# Patient Record
Sex: FEMALE | Race: WHITE | NOT HISPANIC OR LATINO | Employment: UNEMPLOYED | URBAN - METROPOLITAN AREA
[De-identification: names, ages, dates, MRNs, and addresses within clinical notes are randomized per-mention and may not be internally consistent; named-entity substitution may affect disease eponyms.]

---

## 2018-05-30 ENCOUNTER — OFFICE VISIT (OUTPATIENT)
Dept: PEDIATRICS | Facility: CLINIC | Age: 53
End: 2018-05-30
Payer: COMMERCIAL

## 2018-05-30 VITALS
WEIGHT: 115 LBS | DIASTOLIC BLOOD PRESSURE: 62 MMHG | OXYGEN SATURATION: 99 % | BODY MASS INDEX: 20.38 KG/M2 | HEIGHT: 63 IN | HEART RATE: 55 BPM | SYSTOLIC BLOOD PRESSURE: 92 MMHG | TEMPERATURE: 97.7 F

## 2018-05-30 DIAGNOSIS — Z01.818 PREOP GENERAL PHYSICAL EXAM: Primary | ICD-10-CM

## 2018-05-30 DIAGNOSIS — T85.49XA DEFLATION OF BREAST IMPLANT, INITIAL ENCOUNTER: ICD-10-CM

## 2018-05-30 PROCEDURE — 99214 OFFICE O/P EST MOD 30 MIN: CPT | Performed by: PEDIATRICS

## 2018-05-30 RX ORDER — SERTRALINE HYDROCHLORIDE 25 MG/1
25 TABLET, FILM COATED ORAL
COMMUNITY
Start: 2017-10-04 | End: 2021-01-04

## 2018-05-30 RX ORDER — ESTRADIOL 0.04 MG/D
1 PATCH, EXTENDED RELEASE TRANSDERMAL
COMMUNITY
Start: 2017-10-05 | End: 2021-01-04

## 2018-05-30 NOTE — MR AVS SNAPSHOT
After Visit Summary   5/30/2018    Bhakti Ibarra    MRN: [id number]           Patient Information     Date Of Birth          1965        Visit Information        Provider Department      5/30/2018 8:40 AM Beronica Mcdowell MD [facility] [place]        Today's Diagnoses     Preop general physical exam    -  1      Care Instructions    Pre-op Instructions:  -One week prior to surgery: NO aspirin or ibuprofen products (Advil, excerdrin, etc)  -OK to use tylenol for aches and pains  -stop all supplements one week prior to surgery as well.    Please call us with fax #    Before Your Surgery      Call your surgeon if there is any change in your health. This includes signs of a cold or flu (such as a sore throat, runny nose, cough, rash or fever).    Do not smoke, drink alcohol or take over the counter medicine (unless your surgeon or primary care doctor tells you to) for the 24 hours before and after surgery.    If you take prescribed drugs: Follow your doctor s orders about which medicines to take and which to stop until after surgery.    Eating and drinking prior to surgery: follow the instructions from your surgeon    Take a shower or bath the night before surgery. Use the soap your surgeon gave you to gently clean your skin. If you do not have soap from your surgeon, use your regular soap. Do not shave or scrub the surgery site.  Wear clean pajamas and have clean sheets on your bed.           Follow-ups after your visit        Who to contact     If you have questions or need follow up information about today's clinic visit or your schedule please contact [facility] [place] directly at [phone].  Normal or non-critical lab and imaging results will be communicated to you by MyChart, letter or phone within 4 business days after the clinic has received the results. If you do not hear from us within 7 days, please contact the clinic through MyChart or phone. If you have a critical or  "abnormal lab result, we will notify you by phone as soon as possible.  Submit refill requests through [employer] or call your pharmacy and they will forward the refill request to us. Please allow 3 business days for your refill to be completed.          Additional Information About Your Visit        Care EveryWhere ID     This is your Care EveryWhere ID. This could be used by other organizations to access your [place] medical records  [id number]        Your Vitals Were     Pulse Temperature Height Pulse Oximetry BMI (Body Mass Index)       55 97.7  F (36.5  C) (Oral) 5' 3\" (1.6 m) 99% 20.37 kg/m2        Blood Pressure from Last 3 Encounters:   05/30/18 92/62    Weight from Last 3 Encounters:   05/30/18 115 lb (52.2 kg)              Today, you had the following     No orders found for display       Primary Care Provider Fax #    Physician No Ref-Primary [phone]       No address on file        Equal Access to Services     [facility] : Hadii aad ku hadasho Sojesse, waaxda luqadaha, qaybta kaalmada adeegyada, waxay pattiin hayarthur jeff . So [facility] [phone].    ATENCIÓN: Si habla español, tiene a moreno disposición servicios gratuitos de asistencia lingüística. Jordanjackie al [phone].    We comply with applicable federal civil rights laws and [place] laws. We do not discriminate on the basis of race, color, national origin, age, disability, sex, sexual orientation, or gender identity.            Thank you!     Thank you for choosing [facility] [place]  for your care. Our goal is always to provide you with excellent care. Hearing back from our patients is one way we can continue to improve our services. Please take a few minutes to complete the written survey that you may receive in the mail after your visit with us. Thank you!             Your Updated Medication List - Protect others around you: Learn how to safely use, store and throw away your medicines at www.disposemymeds.org.          This " list is accurate as of 5/30/18  9:20 AM.  Always use your most recent med list.                   Brand Name Dispense Instructions for use Diagnosis    estradiol 0.0375 MG/24HR BIW patch    VIVELLE-DOT     1 patch        progesterone 100 MG capsule    PROMETRIUM     Take 100 mg by mouth        sertraline 25 MG tablet    ZOLOFT     Take 25 mg by mouth

## 2018-05-30 NOTE — PATIENT INSTRUCTIONS
Pre-op Instructions:  -One week prior to surgery: NO aspirin or ibuprofen products (Advil, excerdrin, etc)  -OK to use tylenol for aches and pains  -stop all supplements one week prior to surgery as well.    Please call us with fax #    Before Your Surgery      Call your surgeon if there is any change in your health. This includes signs of a cold or flu (such as a sore throat, runny nose, cough, rash or fever).    Do not smoke, drink alcohol or take over the counter medicine (unless your surgeon or primary care doctor tells you to) for the 24 hours before and after surgery.    If you take prescribed drugs: Follow your doctor s orders about which medicines to take and which to stop until after surgery.    Eating and drinking prior to surgery: follow the instructions from your surgeon    Take a shower or bath the night before surgery. Use the soap your surgeon gave you to gently clean your skin. If you do not have soap from your surgeon, use your regular soap. Do not shave or scrub the surgery site.  Wear clean pajamas and have clean sheets on your bed.

## 2018-05-30 NOTE — PROGRESS NOTES
[facility] [place]  [address]  [address]  [place] [address] [phone]  Dept: [phone]    PRE-OP EVALUATION:  Today's date: 2018    Bhakti Ibarra (: 1965) presents for pre-operative evaluation assessment as requested by Dr. Lopez.  She requires evaluation and anesthesia risk assessment prior to undergoing surgery/procedure for treatment of implant removal for implant deflation.    Fax number for surgical facility: [phone] and [phone]  Primary Physician: No Ref-Primary, Physician  Type of Anesthesia Anticipated: to be determined    [facility] - SLP    Patient has a Health Care Directive or Living Will:  NO    Preop Questions 2018   Who is doing your surgery? dr lopez   What are you having done? implant removal   Date of Surgery/Procedure: 2018   1.  Do you have a history of Heart attack, stroke, stent, coronary bypass surgery, or other heart surgery? No   2.  Do you ever have any pain or discomfort in your chest? No   3.  Do you have a history of  Heart Failure? No   4.   Are you troubled by shortness of breath when:  walking on a level surface, or up a slight hill, or at night? No   5.  Do you currently have a cold, bronchitis or other respiratory infection? No   6.  Do you have a cough, shortness of breath, or wheezing? No   7.  Do you sometimes get pains in the calves of your legs when you walk? No   8. Do you or anyone in your family have previous history of blood clots? No   9.  Do you or does anyone in your family have a serious bleeding problem such as prolonged bleeding following surgeries or cuts? No   10. Have you ever had problems with anemia or been told to take iron pills? YES - no bleeding since ablation 5 years ago   11. Have you had any abnormal blood loss such as black, tarry or bloody stools, or abnormal vaginal bleeding? No   12. Have you ever had a blood transfusion? No   13. Have you or any of your relatives ever had  "problems with anesthesia? No   14. Do you have sleep apnea, excessive snoring or daytime drowsiness? No   15. Do you have any prosthetic heart valves? No   16. Do you have prosthetic joints? No   17. Is there any chance that you may be pregnant? No         HPI:     HPI related to upcoming procedure: Patient with bilateral implants, now with right side deflated.  She is electing to get them both removed.      See problem list for active medical problems.  Problems all longstanding and stable, except as noted/documented.  See ROS for pertinent symptoms related to these conditions.                                                                                                                                                          .    MEDICAL HISTORY:   There are no active problems to display for this patient.     History reviewed. No pertinent past medical history.  History reviewed. No pertinent surgical history.  Current Outpatient Prescriptions   Medication Sig Dispense Refill     estradiol (VIVELLE-DOT) 0.0375 MG/24HR BIW patch 1 patch       progesterone (PROMETRIUM) 100 MG capsule Take 100 mg by mouth       sertraline (ZOLOFT) 25 MG tablet Take 25 mg by mouth       OTC products: MVI    No Known Allergies   Latex Allergy: NO    Social History   Substance Use Topics     Smoking status: Never Smoker     Smokeless tobacco: Never Used     Alcohol use Yes      Comment: Social     History   Drug Use No       REVIEW OF SYSTEMS:   Constitutional, HEENT, cardiovascular, pulmonary, gi and gu systems are negative, except as otherwise noted.    EXAM:   BP 92/62 (BP Location: Right arm, Cuff Size: Adult Regular)  Pulse 55  Temp 97.7  F (36.5  C) (Oral)  Ht 5' 3\" (1.6 m)  Wt 115 lb (52.2 kg)  SpO2 99%  BMI 20.37 kg/m2    GENERAL APPEARANCE: healthy, alert and no distress     EYES: EOMI, PERRL     HENT: ear canals and TM's normal and nose and mouth without ulcers or lesions     NECK: no adenopathy, no asymmetry, masses, " or scars and thyroid normal to palpation     RESP: lungs clear to auscultation - no rales, rhonchi or wheezes     CV: regular rates and rhythm, normal S1 S2, no S3 or S4 and no murmur, click or rub     ABDOMEN:  soft, nontender, no HSM or masses and bowel sounds normal     MS: extremities normal- no gross deformities noted, no evidence of inflammation in joints, FROM in all extremities.     PSYCH: mentation appears normal. and affect normal/bright     LYMPHATICS: No cervical adenopathy    DIAGNOSTICS:   No labs or EKG required for low risk surgery (cataract, skin procedure, breast biopsy, etc)    No results for input(s): HGB, PLT, INR, NA, POTASSIUM, CR, A1C in the last 74823 hours.     IMPRESSION:   Reason for surgery/procedure: breast implant deflation    The proposed surgical procedure is considered LOW risk.    REVISED CARDIAC RISK INDEX  The patient has the following serious cardiovascular risks for perioperative complications such as (MI, PE, VFib and 3  AV Block):  No serious cardiac risks  INTERPRETATION: 0 risks: Class I (very low risk - 0.4% complication rate)    The patient has the following additional risks for perioperative complications:  No identified additional risks      ICD-10-CM    1. Preop general physical exam Z01.818    2. Deflation of breast implant, initial encounter T85.49XA        RECOMMENDATIONS:             APPROVAL GIVEN to proceed with proposed procedure, without further diagnostic evaluation       Signed Electronically by: Beronica Mcdowell MD    Copy of this evaluation report is provided to requesting physician.    [place] Preop Guidelines    Revised Cardiac Risk Index

## 2021-01-04 RX ORDER — ESTRADIOL 0.03 MG/D
1 FILM, EXTENDED RELEASE TRANSDERMAL
COMMUNITY
Start: 2020-02-13 | End: 2022-08-25

## 2021-01-04 RX ORDER — NITROFURANTOIN 25; 75 MG/1; MG/1
100 CAPSULE ORAL
COMMUNITY
Start: 2020-02-11 | End: 2023-08-10

## 2021-01-04 RX ORDER — SERTRALINE HYDROCHLORIDE 25 MG/1
25 TABLET, FILM COATED ORAL
COMMUNITY
Start: 2020-02-11

## 2021-01-05 ENCOUNTER — OFFICE VISIT (OUTPATIENT)
Dept: FAMILY MEDICINE | Facility: CLINIC | Age: 56
End: 2021-01-05
Payer: COMMERCIAL

## 2021-01-05 VITALS
SYSTOLIC BLOOD PRESSURE: 98 MMHG | DIASTOLIC BLOOD PRESSURE: 54 MMHG | WEIGHT: 112 LBS | BODY MASS INDEX: 19.84 KG/M2 | OXYGEN SATURATION: 99 % | TEMPERATURE: 97.9 F | RESPIRATION RATE: 18 BRPM | HEART RATE: 56 BPM

## 2021-01-05 DIAGNOSIS — H04.123 DRY EYES: ICD-10-CM

## 2021-01-05 DIAGNOSIS — Z01.818 PREOP GENERAL PHYSICAL EXAM: Primary | ICD-10-CM

## 2021-01-05 LAB — HGB BLD-MCNC: 12 G/DL (ref 11.7–15.7)

## 2021-01-05 PROCEDURE — 84439 ASSAY OF FREE THYROXINE: CPT | Performed by: STUDENT IN AN ORGANIZED HEALTH CARE EDUCATION/TRAINING PROGRAM

## 2021-01-05 PROCEDURE — 85018 HEMOGLOBIN: CPT | Performed by: STUDENT IN AN ORGANIZED HEALTH CARE EDUCATION/TRAINING PROGRAM

## 2021-01-05 PROCEDURE — 84443 ASSAY THYROID STIM HORMONE: CPT | Performed by: STUDENT IN AN ORGANIZED HEALTH CARE EDUCATION/TRAINING PROGRAM

## 2021-01-05 PROCEDURE — 99214 OFFICE O/P EST MOD 30 MIN: CPT | Performed by: STUDENT IN AN ORGANIZED HEALTH CARE EDUCATION/TRAINING PROGRAM

## 2021-01-05 PROCEDURE — 36415 COLL VENOUS BLD VENIPUNCTURE: CPT | Performed by: STUDENT IN AN ORGANIZED HEALTH CARE EDUCATION/TRAINING PROGRAM

## 2021-01-05 NOTE — PATIENT INSTRUCTIONS
"Preparing for Your Surgery  Getting started  A surgery nurse will call you to review your health history and instructions. They will give you an arrival time based on your scheduled surgery time.  Please be ready to share the following:    Your doctor's clinic name and phone number    Your medical, surgical and anesthesia history    A list of allergies and sensitivities    A list of medicines, including herbal treatments and over-the-counter drugs    Whether the patient has a legal guardian (ask how to send us the papers in advance)  If your child is having surgery, please ask for a copy of Preparing for Your Child's Surgery.    Preparing for surgery    Within 30 days of surgery: Have an exam at your family clinic (primary care clinic), or go to a pre-operative clinic. This exam is called a \"History and Physical,\" or H&P.    At your H&P exam, talk to your care team about all medicines you take. If you need to stop any medicines before surgery, ask when to start taking them again.  ? We do this for your safety. Many medicines can make you bleed too much during surgery. Some change how well surgery (anesthesia) drugs work.    Call your insurance company to see what it will and won't pay for. Ask if they need to pre-approve the surgery. (If no insurance, call [phone].)    Call your surgeon's clinic if there's any change in your health. This includes signs of a cold or flu (sore throat, runny nose, cough, rash, fever). It also includes a scrape or scratch near the surgery site.    If you have questions on the day of surgery, call your surgery center.  Eating and drinking guidelines  For your safety: Unless your surgeon tells you otherwise, follow the guidelines below.    Eat and drink as usual until 8 hours before surgery. After that, no food or milk.    Drink clear liquids until 2 hours before surgery. These are liquids you can see through, like water, Gatorade and Propel Water. You may also have black coffee and " tea (no cream or milk).    Nothing by mouth within 2 hours of surgery. This includes gum, candy and breath mints.    Stop alcohol the midnight before surgery.    If your family clinic tells you to take medicine on the morning of surgery, it's okay to take it with a sip of water.  Preventing infection    Shower or bathe the night before and morning of your surgery. Follow the instructions your clinic gave you. (If no instructions, use regular soap.)    Don't shave or clip hair near your surgery site. This can lead to skin infection.    Don't smoke the morning of surgery. Smoking increases the risk of infection. You may chew nicotine gum up to 2 hours before surgery. A nicotine patch is okay.  ? Note: Some surgeries require you to completely quit smoking and nicotine. Check with your surgeon.    Your care team will make every effort to keep you safe from infection. We will:  ? Clean our hands often with soap and water (or an alcohol-based hand rub).  ? Clean the skin at your surgery site with a special soap that kills germs. We'll also remove hair from the site as needed.  ? Wear special hair covers, masks, gowns and gloves during surgery.  ? Give antibiotic medicine, if prescribed. Not all surgeries need antibiotics.  What to bring on the day of surgery    Photo ID and insurance card    Copy of your health care directive, if you have one    Glasses and hearing aides (bring cases)  ? You can't wear contacts during surgery    Inhaler and eye drops, if you use them (tell us about these when you arrive)    CPAP machine or breathing device, if you use them    A few personal items, if spending the night    If you have . . .  ? A pacemaker or ICD (cardiac defibrillator): Bring the ID card.  ? An implanted stimulator: Bring the remote control.  ? A legal guardian: Bring a copy of the certified (court-stamped) guardianship papers.  Please remove any jewelry, including body piercings. Leave jewelry and other valuables at  "home.  If you're going home the day of surgery  Important: If you don't follow the rules below, we must cancel your surgery.     Arrange for someone to drive you home after surgery. You may not drive, take a taxi or take public transportation by yourself (unless you'll have local anesthesia only).    Arrange for a responsible adult to stay with you overnight. If you don't, we may keep you in the hospital overnight, and you may need to pay the costs yourself.  Questions?   If you have any questions for your care team, list them here: _________________________________________________________________________________________________________________________________________________________________________________________________________________________________________________________________________________________________________________________  For informational purposes only. Not to replace the advice of your health care provider. Copyright   8395-9821 [employer]. All rights reserved. Clinically reviewed by Verenice Serrano MD. [employer] [id number] - REV 07/19.    Preparing for Your Surgery  Getting started  A surgery nurse will call you to review your health history and instructions. They will give you an arrival time based on your scheduled surgery time.  Please be ready to share the following:    Your doctor's clinic name and phone number    Your medical, surgical and anesthesia history    A list of allergies and sensitivities    A list of medicines, including herbal treatments and over-the-counter drugs    Whether the patient has a legal guardian (ask how to send us the papers in advance)  If your child is having surgery, please ask for a copy of Preparing for Your Child's Surgery.    Preparing for surgery    Within 30 days of surgery: Have an exam at your family clinic (primary care clinic), or go to a pre-operative clinic. This exam is called a \"History and Physical,\" or H&P.    At your H&P exam, talk to " your care team about all medicines you take. If you need to stop any medicines before surgery, ask when to start taking them again.  ? We do this for your safety. Many medicines can make you bleed too much during surgery. Some change how well surgery (anesthesia) drugs work.    Call your insurance company to see what it will and won't pay for. Ask if they need to pre-approve the surgery. (If no insurance, call [phone].)    Call your surgeon's clinic if there's any change in your health. This includes signs of a cold or flu (sore throat, runny nose, cough, rash, fever). It also includes a scrape or scratch near the surgery site.    If you have questions on the day of surgery, call your surgery center.  Eating and drinking guidelines  For your safety: Unless your surgeon tells you otherwise, follow the guidelines below.    Eat and drink as usual until 8 hours before surgery. After that, no food or milk.    Drink clear liquids until 2 hours before surgery. These are liquids you can see through, like water, Gatorade and Propel Water. You may also have black coffee and tea (no cream or milk).    Nothing by mouth within 2 hours of surgery. This includes gum, candy and breath mints.    Stop alcohol the midnight before surgery.    If your family clinic tells you to take medicine on the morning of surgery, it's okay to take it with a sip of water.  Preventing infection    Shower or bathe the night before and morning of your surgery. Follow the instructions your clinic gave you. (If no instructions, use regular soap.)    Don't shave or clip hair near your surgery site. This can lead to skin infection.    Don't smoke the morning of surgery. Smoking increases the risk of infection. You may chew nicotine gum up to 2 hours before surgery. A nicotine patch is okay.  ? Note: Some surgeries require you to completely quit smoking and nicotine. Check with your surgeon.    Your care team will make every effort to keep you safe  from infection. We will:  ? Clean our hands often with soap and water (or an alcohol-based hand rub).  ? Clean the skin at your surgery site with a special soap that kills germs. We'll also remove hair from the site as needed.  ? Wear special hair covers, masks, gowns and gloves during surgery.  ? Give antibiotic medicine, if prescribed. Not all surgeries need antibiotics.  What to bring on the day of surgery    Photo ID and insurance card    Copy of your health care directive, if you have one    Glasses and hearing aides (bring cases)  ? You can't wear contacts during surgery    Inhaler and eye drops, if you use them (tell us about these when you arrive)    CPAP machine or breathing device, if you use them    A few personal items, if spending the night    If you have . . .  ? A pacemaker or ICD (cardiac defibrillator): Bring the ID card.  ? An implanted stimulator: Bring the remote control.  ? A legal guardian: Bring a copy of the certified (court-stamped) guardianship papers.  Please remove any jewelry, including body piercings. Leave jewelry and other valuables at home.  If you're going home the day of surgery  Important: If you don't follow the rules below, we must cancel your surgery.     Arrange for someone to drive you home after surgery. You may not drive, take a taxi or take public transportation by yourself (unless you'll have local anesthesia only).    Arrange for a responsible adult to stay with you overnight. If you don't, we may keep you in the hospital overnight, and you may need to pay the costs yourself.  Questions?   If you have any questions for your care team, list them here: _________________________________________________________________________________________________________________________________________________________________________________________________________________________________________________________________________________________________________________________  For  "informational purposes only. Not to replace the advice of your health care provider. Copyright   1133-3328 [facility]. All rights reserved. Clinically reviewed by Verenice Serrano MD. [employer] [id number] - REV 07/19.    Before Your Procedure or Hospital Admission  Testing for COVID-19 (Coronavirus)  Thank you for choosing [facility] for your health care needs. The COVID-19 pandemic is a very challenging time for everyone.   Our goal is to keep you and our team here at [facility] safe and healthy. We've taken many steps to make this happen. For example:    We test and screen our staff, care teams and patients for COVID-19.    Everyone at [facility] must wear a mask and stay 6 feet apart.    We are limiting hospital and clinic visitors.  Before you come in  All patients must get tested for COVID-19. Your test needs to happen 3 to 4 days before you check in to the hospital or surgery site.   A clinic scheduler will call about a week in advance to set up a testing time at one of our labs. We'll take a swab of your nose or throat.  Note: If you go to a clinic or pharmacy like [facility] or [facility] for your test, make sure you get a test deep inside the nose. This is called a   naso-pharyngeal (NP) RT-PCR lab test. Don't get a \"rapid\" test or a saliva (spit) test. See \"Questions and Answers\" on the next page.  After the test, please stay at home and stay out of contact with other people. It will be harder for you to recover if you get COVID-19 before your treatment.  Please follow all current safety guidelines, including:    Limit trips outside your home.    Limit the number of people you see.    Always wear a mask outside your home.    Use social distancing. Stay 6 feet away from others whenever you can.    Wash your hands often.  If your test shows you have COVID-19  If your test is positive, we'll let you know. A positive test means that you have the virus.   We'll probably have to postpone your " admission, surgery or procedure. Your doctor will discuss this with you. After that, we'll let you know what to do and when you can re-schedule.   We may need to cancel your treatment on short notice for other reasons, too.  If your test shows you DON'T have COVID-19  Even if your test is negative, you can still get COVID-19. It's rare but, sometimes, the test result is wrong. You could also catch the virus after taking the test.   There's a very small chance that you could catch COVID-19 in the hospital or surgery center. [facility] has taken many steps to prevent this from happening.   Day of your surgery or procedure    Please come wearing a face covering that covers both your nose and mouth.    When you arrive, we'll ask you some questions to find out if you have any signs or symptoms of COVID-19.    Ask your care team if you can have visitors. All visitors must wear face coverings and will be screened for signs of COVID-19.  ? Even if no visitors are allowed, you can still have with you:    Your legal guardian or legal decision maker    A parent and one other visitor, if you are younger than 18 years old    A partner and a , if you are in labor  ? We might need to teach you about taking care of yourself after surgery. If so, a visitor can come into the hospital to learn about it, too.  ? The rules for visitors change often, depending on how much the virus is spreading. To learn more, see Visiting a Loved One in the Hospital during the COVID-19 Outbreak.  Please call your care team, hospital or surgery center if you have any questions. We thank you for your understanding and for choosing [facility] for your care.   Questions and Answers  Does it matter where I get tested for COVID-19?  Yes. We urge you to get tested at one of our [facility] COVID-19 testing sites. We process these tests in our lab and can get the results quickly. Your [facility] care team needs to get your  "results before you check in.  What should I do if I can't get tested at [facility]?  You can get tested somewhere else, but you'll need to take these extra steps:   1. Contact your family doctor or clinic to arrange your test.  2. Take the test within 4 days of your surgery or procedure. We can't accept tests older than 4 days.  3. Make sure you're getting a test deep inside the nose (a naso-pharyngeal, or NP, RT-PCR lab test). Many pharmacies use \"rapid\" tests or saliva (spit) tests. We don't accept those tests before surgery or procedures. For adults, tests deep inside the nose are the most accurate tests.  4. Make sure your doctor or clinic faxes your results to [facility] at [phone].  If we don't get your results in time, we may have to delay or cancel your treatment.  For informational purposes only. Not to replace the advice of your health care provider. Copyright   2020 [facility]. All rights reserved. Clinically reviewed by [facility] and the [facility] COVID-19 Clinical Team. Anki [id number] - Rev 12/16/20.    "

## 2021-01-05 NOTE — PROGRESS NOTES
[facility] [address] [place] [address]  [phone]  Dept: [phone]    PRE-OP EVALUATION:  Today's date: 2021    Bhakti Ibarra (: 1965) presents for pre-operative evaluation assessment as requested by Dr. Birmingham at [facility] on 2020.  She requires evaluation and anesthesia risk assessment prior to undergoing surgery/procedure for treatment of Mini-face lift .    Fax number for surgical facility: [phone]  Primary Physician: No Ref-Primary, Physician  Type of Anesthesia Anticipated: General      Preop Questionnnaire:  Pre-op Questionnaire 2021   Surgeon: Dr Sandoval   Surgery/Procedure: Mini face lift   Surgery Date: 2021   Time of Surgery: TBD   1. Have you ever had a heart attack or stroke? No   2. Have you ever had surgery on your heart or blood vessels, such as a stent placement, a coronary artery bypass, or surgery on an artery in your head, neck, heart, or legs? No   3. Do you have chest pain with activity? No, she runs 40 miles a week without difficulty    4. Do you have a history of  heart failure? No   5. Do you currently have a cold, bronchitis or symptoms of other infection? No   6. Do you have a cough, shortness of breath, or wheezing? No   7. Do you or anyone in your family have previous history of blood clots? No   8. Do you or does anyone in your family have a serious bleeding problem such as prolonged bleeding following surgeries or cuts? No   9. Have you ever had problems with anemia or been told to take iron pills? YES - intermittent anemia over the years due to low iron   10. Have you had any abnormal blood loss such as black, tarry or bloody stools, or abnormal vaginal bleeding? No   11. Have you ever had a blood transfusion? No   12. Are you willing to have a blood transfusion if it is medically needed before, during, or after your surgery? Yes   13. Have you or any of your relatives ever  had problems with anesthesia? No   14. Do you have sleep apnea, excessive snoring or daytime drowsiness? No   15. Do you have any artifical heart valves or other implanted medical devices like a pacemaker, defibrillator, or continuous glucose monitor? No   16. Do you have artificial joints? No   17. Are you allergic to latex? No   18. Is there any chance that you may be pregnant? No       HPI:     HPI related to upcoming procedure:  Having mini lower face lift for cosmetic reasons.    Dry eyes - She would also like to discuss dry eyes. Wondering if she has a thyroid condition. Also endorses fatigue, dry skin, constipation. Family history significant for mother with low thyroid function. Using drops, warm compresses and an eye spray purchased OTC. These were recommended by her ophthalmologist. No eye itching or year round allergies. No dry mouth.     No chronic medical problems apart from dry eyes.     MEDICAL HISTORY:     Patient Active Problem List    Diagnosis Date Noted     Dry eyes 01/05/2021     Priority: Medium      History reviewed. No pertinent past medical history.  Past Surgical History:   Procedure Laterality Date     APPENDECTOMY       breast impant  2001     HC REMOVAL OF BREAST IMPLANT  2019     Current Outpatient Medications   Medication Sig Dispense Refill     estradiol (VIVELLE-DOT) 0.025 MG/24HR bi-weekly patch Place 1 patch onto the skin       nitroFURantoin macrocrystal-monohydrate (MACROBID) 100 MG capsule Take 100 mg by mouth       progesterone (PROMETRIUM) 100 MG capsule Take 100 mg by mouth       sertraline (ZOLOFT) 25 MG tablet Take 25 mg by mouth       OTC products: none    No Known Allergies   Latex Allergy: NO    Social History     Tobacco Use     Smoking status: Never Smoker     Smokeless tobacco: Never Used   Substance Use Topics     Alcohol use: Yes     Comment: Social     History   Drug Use No       REVIEW OF SYSTEMS:   Constitutional, neuro, ENT, endocrine, pulmonary, cardiac,  gastrointestinal, genitourinary, musculoskeletal, integument and psychiatric systems are negative, except as otherwise noted.    EXAM:   BP 98/54   Pulse 56   Temp 97.9  F (36.6  C) (Tympanic)   Resp 18   Wt 50.8 kg (112 lb)   SpO2 99%   BMI 19.84 kg/m      GENERAL APPEARANCE: healthy, alert and no distress     EYES: EOMI, PERRL     HENT: ear canals and TM's normal and nose and mouth without ulcers or lesions, injected sclerae bilaterally      NECK: no adenopathy, no asymmetry, masses, or scars and thyroid normal to palpation     RESP: lungs clear to auscultation - no rales, rhonchi or wheezes     CV: regular rates and rhythm, normal S1 S2, no S3 or S4 and no murmur, click or rub     ABDOMEN:  soft, nontender, no HSM or masses and bowel sounds normal     MS: extremities normal- no gross deformities noted, no evidence of inflammation in joints, FROM in all extremities.     SKIN: no suspicious lesions or rashes     NEURO: Normal strength and tone, sensory exam grossly normal, mentation intact and speech normal     PSYCH: mentation appears normal. and affect normal/bright     LYMPHATICS: No cervical adenopathy    DIAGNOSTICS:   EKG: Not indicated due to non-vascular surgery and low risk of event (age <65 and without cardiac risk factors)    Labs:  Hemoglobin (indicated for history of anemia or procedure with significant blood loss such as tonsillectomy, major intraperitoneal surgery, vascular surgery, major spine surgery, total joint replacement)  No pregnancy test needed due to post menopausal state.     Labs pending.    IMPRESSION:   Reason for surgery/procedure: Face lift  Diagnosis/reason for consult: As above.    The proposed surgical procedure is considered INTERMEDIATE risk.      REVISED CARDIAC RISK INDEX  The patient has the following serious cardiovascular risks for perioperative complications such as (MI, PE, VFib and 3  AV Block):  No serious cardiac risks  INTERPRETATION: 0 risks: Class I (very low  risk - 0.4% complication rate)    The patient has the following additional risks for perioperative complications:  No identified additional risks      ICD-10-CM    1. Preop general physical exam  Z01.818    2. Dry eyes  H04.123 TSH     T4, free       RECOMMENDATIONS:     --Patient is to take all scheduled medications on the day of surgery EXCEPT for modifications listed below.  -Hold vitamins and supplements as directed by surgeon.    Pending review of diagnostic evaluation, APPROVAL GIVEN to proceed with proposed procedure pending diagnostic evaluation.       Signed Electronically by: Kirsty Cortes MD    Copy of this evaluation report is provided to requesting physician.    [facility] Preop Guidelines    Revised Cardiac Risk Index

## 2021-01-05 NOTE — LETTER
January 11, 2021      Bhakti AYALA Ibarra  [address] [address]        Dear ,    We are writing to inform you of your test results. All results are in normal range. I recommend fish oil for the dry eye (not until after surgery), place a humidifier in your room or office if not already done, and frequent blinking as well as follow up with Optho if needed.     Resulted Orders   TSH   Result Value Ref Range    TSH 2.47 0.40 - 4.00 mU/L   T4, free   Result Value Ref Range    T4 Free 0.90 0.76 - 1.46 ng/dL   Hemoglobin   Result Value Ref Range    Hemoglobin 12.0 11.7 - 15.7 g/dL       If you have any questions or concerns, please call the clinic at the number listed above.       Sincerely,      Kirsty Cortes MD

## 2021-01-06 LAB
T4 FREE SERPL-MCNC: 0.9 NG/DL (ref 0.76–1.46)
TSH SERPL DL<=0.005 MIU/L-ACNC: 2.47 MU/L (ref 0.4–4)

## 2021-01-07 NOTE — RESULT ENCOUNTER NOTE
Patient requests phone call with results.    Please let her know that all results are in normal range. I recommend fish oil for the dry eye (not until after surgery), place a humidifier in her room or office if not already done, and frequent blinking as well as follow up with Optho if needed.     Please also fax results to her surgeon.    Thanks!  Kirsty Cortes MD

## 2021-03-02 DIAGNOSIS — Z12.31 ENCOUNTER FOR SCREENING MAMMOGRAM FOR BREAST CANCER: Primary | ICD-10-CM

## 2021-03-02 NOTE — PROGRESS NOTES
Please let patient know she is due for mammogram. Orders placed. Also due for colon cancer screen. Please schedule physical.

## 2021-12-17 RX ORDER — LIFITEGRAST 50 MG/ML
1 SOLUTION/ DROPS OPHTHALMIC 2 TIMES DAILY
COMMUNITY
Start: 2020-12-14

## 2021-12-17 RX ORDER — LIFITEGRAST 50 MG/ML
SOLUTION/ DROPS OPHTHALMIC
COMMUNITY
Start: 2021-11-18 | End: 2022-08-25

## 2021-12-17 RX ORDER — LIFITEGRAST 50 MG/ML
SOLUTION/ DROPS OPHTHALMIC
Qty: 60 EACH | OUTPATIENT
Start: 2021-12-17

## 2021-12-17 NOTE — TELEPHONE ENCOUNTER
XIIDRA 5 % opthalmic solution   INSTILL 1 DROP BOTH EYES TWICE DAILY FOR 90 DAYS     Last Written Prescription Date:  11/18/21  Last Fill Quantity: n/a   # refills: n/a  Last Office Visit : none  Future Office visit: none    Routing refill request to provider for review/approval because:  Medication is reported/historical. Routed to PCP.

## 2021-12-17 NOTE — TELEPHONE ENCOUNTER
Left message on patient's voicemail to call back and speak with a triage nurse.     RICO IsabelN RN  [facility]

## 2021-12-17 NOTE — TELEPHONE ENCOUNTER
Please call patient and ask that refill request goes to ordering provider (was this initially prescribed by an ophthalmologist?) or clarify who initially prescribed this.     Kirsty Cortes MD  [facility]  [phone]

## 2021-12-22 NOTE — TELEPHONE ENCOUNTER
Left generic message to seek an eye drop refill through the provider who originally prescribed it, and to call back clinic with questions or concerns.  Encounter closed.    Kelsi Lo RN  [facility]

## 2022-08-23 ENCOUNTER — NURSE TRIAGE (OUTPATIENT)
Dept: NURSING | Facility: CLINIC | Age: 57
End: 2022-08-23

## 2022-08-24 NOTE — TELEPHONE ENCOUNTER
"Nurse Triage SBAR    Is this a 2nd Level Triage? NO    Situation: patient has had abdominal pain since Sunday.    Background: she has a history of Neuralgia when she gets sick.  She has had her appendix removed.    Assessment: She states her stomach hurt worse this morning.    She also had diarrhea this morning- unsure of number.  Pain is 5/10 sometimes, and sometimes it is a 10/10.  Pain is a 5-6 right now.  Pain has been constant- since Sunday night.  Patient does not have a fever.  She states she feels the pain is in her colon.    Protocol Recommended Disposition:   See HCP Within 4 Hours (Or PCP Triage)    Recommendation: page on call provider    Paged provider 7:19 Anny Ngo MD  Second page 7:51 pm   Answering service called 8:09pm called medical provider.  Medical Director called by answering service.    RN unable to reach on call provider. Patient advised to go in to ER if her pain gets worse.    Patient would like to make an appointment for tomorrow since her pain is feeling better when she lays down.       Reason for Disposition    [1] MILD-MODERATE pain AND [2] constant AND [3] present > 2 hours    Additional Information    Negative: Shock suspected (e.g., cold/pale/clammy skin, too weak to stand, low BP, rapid pulse)    Negative: Difficult to awaken or acting confused (e.g., disoriented, slurred speech)    Negative: Passed out (i.e., lost consciousness, collapsed and was not responding)    Negative: Sounds like a life-threatening emergency to the triager    Negative: Chest pain    Negative: Pain is mainly in upper abdomen  (if needed ask: \"is it mainly above the belly button?\")    Negative: Followed an abdomen (stomach) injury    Negative: [1] Abdominal pain AND [2] pregnant < 20 weeks    Negative: [1] Abdominal pain AND [2] pregnant 20 or more weeks    Negative: [1] Abdominal pain AND [2] postpartum (from 0 to 6 weeks after delivery)    Negative: [1] SEVERE pain (e.g., excruciating) AND [2] present " "> 1 hour    Negative: [1] SEVERE pain AND [2] age > 60 years    Negative: [1] Vomiting AND [2] contains red blood or black (\"coffee ground\") material  (Exception: few red streaks in vomit that only happened once)    Negative: Blood in bowel movements (Exception: blood on surface of BM with constipation)    Negative: Black or tarry bowel movements (Exception: chronic-unchanged black-grey bowel movements AND is taking iron pills or Pepto-bismol)    Negative: Patient sounds very sick or weak to the triager    Protocols used: ABDOMINAL PAIN - FEMALE-A-AH      "

## 2022-08-25 ENCOUNTER — OFFICE VISIT (OUTPATIENT)
Dept: INTERNAL MEDICINE | Facility: CLINIC | Age: 57
End: 2022-08-25
Payer: COMMERCIAL

## 2022-08-25 VITALS
SYSTOLIC BLOOD PRESSURE: 103 MMHG | TEMPERATURE: 97.8 F | OXYGEN SATURATION: 100 % | HEART RATE: 54 BPM | DIASTOLIC BLOOD PRESSURE: 64 MMHG

## 2022-08-25 DIAGNOSIS — R10.84 ABDOMINAL PAIN, GENERALIZED: Primary | ICD-10-CM

## 2022-08-25 DIAGNOSIS — Z12.11 SCREEN FOR COLON CANCER: ICD-10-CM

## 2022-08-25 PROBLEM — T85.44XA CAPSULAR CONTRACTURE OF BREAST IMPLANT: Status: ACTIVE | Noted: 2018-05-23

## 2022-08-25 LAB
ALBUMIN SERPL-MCNC: 3.4 G/DL (ref 3.4–5)
ALP SERPL-CCNC: 67 U/L (ref 40–150)
ALT SERPL W P-5'-P-CCNC: 34 U/L (ref 0–50)
ANION GAP SERPL CALCULATED.3IONS-SCNC: 3 MMOL/L (ref 3–14)
AST SERPL W P-5'-P-CCNC: 39 U/L (ref 0–45)
BASOPHILS # BLD AUTO: 0 10E3/UL (ref 0–0.2)
BASOPHILS NFR BLD AUTO: 0 %
BILIRUB SERPL-MCNC: 0.8 MG/DL (ref 0.2–1.3)
BUN SERPL-MCNC: 13 MG/DL (ref 7–30)
CALCIUM SERPL-MCNC: 9.3 MG/DL (ref 8.5–10.1)
CHLORIDE BLD-SCNC: 106 MMOL/L (ref 94–109)
CO2 SERPL-SCNC: 29 MMOL/L (ref 20–32)
CREAT SERPL-MCNC: 0.67 MG/DL (ref 0.52–1.04)
EOSINOPHIL # BLD AUTO: 0.1 10E3/UL (ref 0–0.7)
EOSINOPHIL NFR BLD AUTO: 1 %
ERYTHROCYTE [DISTWIDTH] IN BLOOD BY AUTOMATED COUNT: 13.6 % (ref 10–15)
GFR SERPL CREATININE-BSD FRML MDRD: >90 ML/MIN/1.73M2
GLUCOSE BLD-MCNC: 100 MG/DL (ref 70–99)
HCT VFR BLD AUTO: 36.5 % (ref 35–47)
HGB BLD-MCNC: 11.8 G/DL (ref 11.7–15.7)
LYMPHOCYTES # BLD AUTO: 1.4 10E3/UL (ref 0.8–5.3)
LYMPHOCYTES NFR BLD AUTO: 26 %
MCH RBC QN AUTO: 29.9 PG (ref 26.5–33)
MCHC RBC AUTO-ENTMCNC: 32.3 G/DL (ref 31.5–36.5)
MCV RBC AUTO: 93 FL (ref 78–100)
MONOCYTES # BLD AUTO: 0.4 10E3/UL (ref 0–1.3)
MONOCYTES NFR BLD AUTO: 7 %
NEUTROPHILS # BLD AUTO: 3.6 10E3/UL (ref 1.6–8.3)
NEUTROPHILS NFR BLD AUTO: 66 %
PLATELET # BLD AUTO: 185 10E3/UL (ref 150–450)
POTASSIUM BLD-SCNC: 3.6 MMOL/L (ref 3.4–5.3)
PROT SERPL-MCNC: 7.5 G/DL (ref 6.8–8.8)
RBC # BLD AUTO: 3.94 10E6/UL (ref 3.8–5.2)
SODIUM SERPL-SCNC: 138 MMOL/L (ref 133–144)
WBC # BLD AUTO: 5.5 10E3/UL (ref 4–11)

## 2022-08-25 PROCEDURE — 80053 COMPREHEN METABOLIC PANEL: CPT | Performed by: INTERNAL MEDICINE

## 2022-08-25 PROCEDURE — 85025 COMPLETE CBC W/AUTO DIFF WBC: CPT | Performed by: INTERNAL MEDICINE

## 2022-08-25 PROCEDURE — 36415 COLL VENOUS BLD VENIPUNCTURE: CPT | Performed by: INTERNAL MEDICINE

## 2022-08-25 PROCEDURE — 99213 OFFICE O/P EST LOW 20 MIN: CPT | Performed by: INTERNAL MEDICINE

## 2022-08-25 RX ORDER — PROGESTERONE 100 MG/1
100 CAPSULE ORAL
COMMUNITY
Start: 2022-06-07 | End: 2022-10-20

## 2022-08-25 RX ORDER — ESTRADIOL 0.03 MG/D
1 FILM, EXTENDED RELEASE TRANSDERMAL
COMMUNITY
Start: 2022-06-09 | End: 2022-10-20

## 2022-08-25 NOTE — PROGRESS NOTES
Assessment & Plan   Abdominal pain, generalized  DDX wide including viral gastroenteritis (abdominal pain/cramping maybe consistent with Campylobacter) vs diverticulitis (no past CT or colonoscopy) vs ovarian pathology vs UTI vs other. Recommended basic blood work, U/A, stool kit as initial step. She declined U/A. Would like to think about stool kit. Accepted orders for limited blood work as below. If all normal and pain persists, CT a/p and/or colonoscopy would be next step. Encouraged bland diet in case this is gastroenteritis or diverticulitis.  - CBC with Platelets & Differential; Future  - Comprehensive metabolic panel; Future  - Clostridium difficile toxin B PCR; Future  - Enteric Bacteria and Virus Panel by MARYA Stool; Future  - Fecal Lactoferrin; Future  - Routine parasitology exam; Future    Screen for colon cancer  As she left clinic today Bhakti requested a referral for a colonoscopy for colon cancer screening. Referral placed.  - Colonoscopy Screening  Referral; Future    F/u with regular PCP at regular interval or sooner GUILLERMO Guan MD  [facility]   Bhakti is a 57 year old who presents for same-day acute care visit for abdominal pain and diarrhea. Abdominal pain started Sunday and continued. Diarrhea started a couple of days later. Pepto helped. Appetite decreased. No blood in stool. No syncope. No lightheadedness. 'I feel weak'. No one around her is sick. She drinks kombucha every day. No CP. No SOB. No dysuria. Pain is in lower abdomen. She reports she had her appendix out years ago.    Review of Systems   Constitutional, pulm, CV, gu, gi systems are negative, except as otherwise noted.      Objective    /64   Pulse 54   Temp 97.8  F (36.6  C) (Oral)   SpO2 100%   There is no height or weight on file to calculate BMI.     Physical Exam   GENERAL: alert and in no distress.  EYES: conjunctivae/corneas clear. EOMs grossly  intact  HENT: Facies symmetric.  RESP: No iWOB  GI: TTP in RLQ, ND, without guarding, mild rebounding  MSK: No edema. Moves all four extremities freely.  SKIN: No significant ulcers, lesions, or rashes on the visualized portions of the skin  NEURO: CN II-XII grossly intact.

## 2022-08-25 NOTE — LETTER
[facility]  [address] [address]  Phone: [phone]   8/26/2022      Bhakti Ibarra  [address] [id number]        Dear Ms. Bhakti Ibarra:    I am writing to inform you of the results of the blood tests you had done recently. Your electrolytes and kidneys look good. Your liver looks good. Your blood counts are normal.    If you have any further questions or problems, please contact our nurse line at [phone]. An even easier way to get ahold of our team is through the graftert, which you can sign up for at [email or web address]. MyChart is not only a great way to communicate with us, but also allows you to see your full results.        Sincerely,        Lauri Guan MD, MPH  Department of Internal Medicine  [facility]

## 2022-09-13 ENCOUNTER — TELEPHONE (OUTPATIENT)
Dept: GASTROENTEROLOGY | Facility: CLINIC | Age: 57
End: 2022-09-13

## 2022-09-13 NOTE — TELEPHONE ENCOUNTER
Screening Questions    BlueKIND OF PREP RedLOCATION [review exclusion criteria] GreenSEDATION TYPE      1. Are you active on mychart? N    2. What insurance is in the chart? [employer]     3.   Ordering/Referring Provider: Lauri Kat MD in  INTERNAL MEDICINE    4. BMI   (If greater than 40 review exclusion criteria [PAC APPT IF [MAC] @ [facility])  19.0  [If yes, BMI OVER 40-EXTENDED PREP]      **(Sedation review/consideration needed)**  Do you have a legal guardian or Medical Power of    and/or are you able to give consent for your medical care?     SELF    5. Have you had a positive covid test in the last 90 days?   N -     6.  Are you currently on dialysis?   N [ If yes, G-PREP & HOSPITAL setting ONLY]     7.  Do you have chronic kidney disease?  N [ If yes, G-PREP ]    8.   Do you have a diagnosis of diabetes?   N   [ If yes, G-PREP ]    9.  On a regular basis do you go 3-5 days between bowel movements?   N   [ If yes, EXTENDED PREP]    10.  Are you taking any prescription pain medications on a routine schedule?    N -  [ If yes, EXTENDED PREP] [If yes, MAC]      11.   Do you have any chemical dependencies such as alcohol, street drugs, or methadone?    N [If yes, MAC]    12.   Do you have any history of post-traumatic stress syndrome, severe anxiety or history of psychosis?    N  [If yes, MAC]    13.  [FEMALES] Are you currently pregnant?     If yes, how many weeks?       Respiratory/Heart Screening:  [If yes to any of the following HOSPITAL setting only]     14. Do you have Pulmonary Hypertension [Lungs]?   N       15. Do you have UNCONTROLLED asthma?   N     16.  Do you use daily home oxygen?  N      17. Do you have mod to severe Obstructive Sleep Apnea?         (OKAY @  [facility]  SH  PH  RI  MG - if pt is not on OXYGEN)  N      18.   Have you had a heart or lung transplant?   N      19.   Have you had a stroke or Transient ischemic attack (TIA - aka  mini stroke ) within 6 months?  (If yes,  please review exclusion criteria)  N     20.   In the past 6 months, have you had any heart related issues including cardiomyopathy or heart attack?   N           If yes, did it require cardiac stenting or other implantable device?         21.   Do you have any implantable devices in your body (pacemaker, defib, LVAD)? (If yes, please review exclusion criteria)  N   22.  Do you take the medication Phentermine?  NO        23. Do you take nitroglycerin?   N           If yes, how often?   (if yes, HOSPITAL setting ONLY)    24.  Are you currently taking any blood thinners?    [If yes, INFORM patient to follw up w/ ORDERING PROVIDER FOR BRIDGING INSTRUCTIONS]     N    25.   Do you transfer independently?                (If NO, please HOSPITAL setting ONLY)  Y    26.   Preferred LOCAL Pharmacy for Pre Prescription:      [id number] - [place], [place] - [address]  AT [address]    Scheduling Details  (Please ask for phone number if not scheduled by patient)      Caller : Bhakti Ibarra  Date of Procedure: 10/25/22  Surgeon: Michelle  Location:         Sedation Type: CS l   Conscious Sedation- Needs  for 6 hours after the procedure  MAC/General-Needs  for 24 hours after procedure    N :[Pre-op Required] at [facility]  SH  MG and OR for MAC sedation   (advise patient they will need a pre-op WITH IN 30 DAYS of procedure date)     Type of Procedure Scheduled:   Lower Endoscopy [Colonoscopy]    Which Colonoscopy Prep was Sent?:   Golytely -     KHORUTS CF PATIENTS & GROEN'S PATIENTS NEEDS EXTENDED PREP       Informed patient they will need an adult  Y  Cannot take any type of public or medical transportation alone    Pre-Procedure Covid test to be completed at [facility] or Externally: home  **INFORMED OF HOME TESTING & LAB OPTION**        Confirmed Nurse will call to complete assessment Y    Additional comments:

## 2022-10-06 RX ORDER — BISACODYL 5 MG
TABLET, DELAYED RELEASE (ENTERIC COATED) ORAL
Qty: 4 TABLET | Refills: 0 | Status: SHIPPED | OUTPATIENT
Start: 2022-10-06 | End: 2023-08-10

## 2022-10-25 ENCOUNTER — HOSPITAL ENCOUNTER (OUTPATIENT)
Facility: CLINIC | Age: 57
Discharge: HOME | End: 2022-10-25
Attending: INTERNAL MEDICINE | Admitting: INTERNAL MEDICINE
Payer: COMMERCIAL

## 2022-10-25 VITALS
SYSTOLIC BLOOD PRESSURE: 95 MMHG | TEMPERATURE: 97.1 F | WEIGHT: 107 LBS | OXYGEN SATURATION: 98 % | RESPIRATION RATE: 14 BRPM | HEIGHT: 63 IN | DIASTOLIC BLOOD PRESSURE: 56 MMHG | HEART RATE: 48 BPM | BODY MASS INDEX: 18.96 KG/M2

## 2022-10-25 DIAGNOSIS — Z12.11 SPECIAL SCREENING FOR MALIGNANT NEOPLASMS, COLON: Primary | ICD-10-CM

## 2022-10-25 LAB — COLONOSCOPY: NORMAL

## 2022-10-25 PROCEDURE — G0500 MOD SEDAT ENDO SERVICE >5YRS: HCPCS | Performed by: INTERNAL MEDICINE

## 2022-10-25 PROCEDURE — 99153 MOD SED SAME PHYS/QHP EA: CPT

## 2022-10-25 PROCEDURE — 250N000011 HC RX IP 250 OP 636: Performed by: INTERNAL MEDICINE

## 2022-10-25 PROCEDURE — 45378 DIAGNOSTIC COLONOSCOPY: CPT | Performed by: INTERNAL MEDICINE

## 2022-10-25 PROCEDURE — G0121 COLON CA SCRN NOT HI RSK IND: HCPCS | Performed by: INTERNAL MEDICINE

## 2022-10-25 RX ORDER — ONDANSETRON 2 MG/ML
4 INJECTION INTRAMUSCULAR; INTRAVENOUS
Status: DISCONTINUED | OUTPATIENT
Start: 2022-10-25 | End: 2022-10-25 | Stop reason: HOSPADM

## 2022-10-25 RX ORDER — SIMETHICONE 40MG/0.6ML
133 SUSPENSION, DROPS(FINAL DOSAGE FORM)(ML) ORAL
Status: DISCONTINUED | OUTPATIENT
Start: 2022-10-25 | End: 2022-10-25 | Stop reason: HOSPADM

## 2022-10-25 RX ORDER — ATROPINE SULFATE 0.1 MG/ML
1 INJECTION INTRAVENOUS
Status: DISCONTINUED | OUTPATIENT
Start: 2022-10-25 | End: 2022-10-25 | Stop reason: HOSPADM

## 2022-10-25 RX ORDER — NALOXONE HYDROCHLORIDE 0.4 MG/ML
0.4 INJECTION, SOLUTION INTRAMUSCULAR; INTRAVENOUS; SUBCUTANEOUS
Status: DISCONTINUED | OUTPATIENT
Start: 2022-10-25 | End: 2022-10-25 | Stop reason: HOSPADM

## 2022-10-25 RX ORDER — PROCHLORPERAZINE MALEATE 10 MG
10 TABLET ORAL EVERY 6 HOURS PRN
Status: DISCONTINUED | OUTPATIENT
Start: 2022-10-25 | End: 2022-10-25 | Stop reason: HOSPADM

## 2022-10-25 RX ORDER — DIPHENHYDRAMINE HYDROCHLORIDE 50 MG/ML
25-50 INJECTION INTRAMUSCULAR; INTRAVENOUS
Status: DISCONTINUED | OUTPATIENT
Start: 2022-10-25 | End: 2022-10-25 | Stop reason: HOSPADM

## 2022-10-25 RX ORDER — ONDANSETRON 2 MG/ML
4 INJECTION INTRAMUSCULAR; INTRAVENOUS EVERY 6 HOURS PRN
Status: DISCONTINUED | OUTPATIENT
Start: 2022-10-25 | End: 2022-10-25 | Stop reason: HOSPADM

## 2022-10-25 RX ORDER — NALOXONE HYDROCHLORIDE 0.4 MG/ML
0.2 INJECTION, SOLUTION INTRAMUSCULAR; INTRAVENOUS; SUBCUTANEOUS
Status: DISCONTINUED | OUTPATIENT
Start: 2022-10-25 | End: 2022-10-25 | Stop reason: HOSPADM

## 2022-10-25 RX ORDER — LIDOCAINE 40 MG/G
CREAM TOPICAL
Status: DISCONTINUED | OUTPATIENT
Start: 2022-10-25 | End: 2022-10-25 | Stop reason: HOSPADM

## 2022-10-25 RX ORDER — ONDANSETRON 4 MG/1
4 TABLET, ORALLY DISINTEGRATING ORAL EVERY 6 HOURS PRN
Status: DISCONTINUED | OUTPATIENT
Start: 2022-10-25 | End: 2022-10-25 | Stop reason: HOSPADM

## 2022-10-25 RX ORDER — FENTANYL CITRATE 0.05 MG/ML
50-100 INJECTION, SOLUTION INTRAMUSCULAR; INTRAVENOUS EVERY 5 MIN PRN
Status: DISCONTINUED | OUTPATIENT
Start: 2022-10-25 | End: 2022-10-25 | Stop reason: HOSPADM

## 2022-10-25 RX ORDER — EPINEPHRINE 1 MG/ML
0.1 INJECTION, SOLUTION INTRAMUSCULAR; SUBCUTANEOUS
Status: DISCONTINUED | OUTPATIENT
Start: 2022-10-25 | End: 2022-10-25 | Stop reason: HOSPADM

## 2022-10-25 RX ORDER — FLUMAZENIL 0.1 MG/ML
0.2 INJECTION, SOLUTION INTRAVENOUS
Status: DISCONTINUED | OUTPATIENT
Start: 2022-10-25 | End: 2022-10-25 | Stop reason: HOSPADM

## 2022-10-25 RX ADMIN — MIDAZOLAM HYDROCHLORIDE 2 MG: 1 INJECTION, SOLUTION INTRAMUSCULAR; INTRAVENOUS at 12:52

## 2022-10-25 RX ADMIN — FENTANYL CITRATE 100 MCG: 50 INJECTION INTRAMUSCULAR; INTRAVENOUS at 12:52

## 2022-10-25 RX ADMIN — MIDAZOLAM HYDROCHLORIDE 1 MG: 1 INJECTION, SOLUTION INTRAMUSCULAR; INTRAVENOUS at 13:01

## 2022-10-25 RX ADMIN — FENTANYL CITRATE 50 MCG: 50 INJECTION INTRAMUSCULAR; INTRAVENOUS at 13:01

## 2022-10-25 ASSESSMENT — ACTIVITIES OF DAILY LIVING (ADL): ADLS_ACUITY_SCORE: 35

## 2022-10-25 NOTE — H&P
Pre-Endoscopy History and Physical     Bhakti Ibarra MRN# [id number]   YOB: 1965 Age: 57 year old     Date of Procedure: 10/25/2022  Primary care provider: Kirsty Cortes  Type of Endoscopy: Colonoscopy with possible biopsy, possible polypectomy  Reason for Procedure: screen  Type of Anesthesia Anticipated: Conscious Sedation    HPI:    Bhakti is a 57 year old female who will be undergoing the above procedure.      A history and physical has been performed. The patient's medications and allergies have been reviewed. The risks and benefits of the procedure and the sedation options and risks were discussed with the patient.  All questions were answered and informed consent was obtained.      She denies a personal or family history of anesthesia complications or bleeding disorders.     Patient Active Problem List   Diagnosis     Dry eyes     Umbilical hernia     Premenstrual tension syndrome     Myalgia     Diastasis of muscle     Capsular contracture of breast implant     Deficiency anemia        Past Medical History:   Diagnosis Date     No known health problems         Past Surgical History:   Procedure Laterality Date     APPENDECTOMY       breast impant  2001      REMOVAL OF BREAST IMPLANT  2019       Social History     Tobacco Use     Smoking status: Never     Smokeless tobacco: Never   Substance Use Topics     Alcohol use: Yes     Comment: Social       Family History   Problem Relation Age of Onset     Breast Cancer Mother        Prior to Admission medications    Medication Sig Start Date End Date Taking? Authorizing Provider   bisacodyl (DULCOLAX) 5 MG EC tablet Take 2 tablets at 3 pm the day before your procedure. If your procedure is before 11 am, take 2 additional tablets at 8 pm. If your procedure is after 11 am, take 2 additional tablets at 6 am. For additional instructions refer to your colonoscopy prep instructions. 10/6/22  Yes Yusuf Martinez MD   lifitegrast (XIIDRA) 5 %  "opthalmic solution Place 1 drop into both eyes 2 times daily Instill 1 drop in both eyes twice daily  for 90 days. 12/14/20  Yes Reported, Patient   nitroFURantoin macrocrystal-monohydrate (MACROBID) 100 MG capsule Take 100 mg by mouth 2/11/20  Yes Reported, Patient   polyethylene glycol (GOLYTELY) 236 g suspension The night before the exam at 6 pm drink an 8-ounce glass every 15 minutes until the jug is half empty. If you arrive before 11 AM: Drink the other half of the Golytely jug at 11 PM night before procedure. If you arrive after 11 AM: Drink the other half of the Golytely jug at 6 AM day of procedure. For additional instructions refer to your colonoscopy prep instructions. 10/6/22  Yes Yusuf Martinez MD   sertraline (ZOLOFT) 25 MG tablet Take 25 mg by mouth 2/11/20  Yes Reported, Patient       No Known Allergies     REVIEW OF SYSTEMS:   5 point ROS negative except as noted above in HPI, including Gen., Resp., CV, GI &  system review.    PHYSICAL EXAM:   There were no vitals taken for this visit. Estimated body mass index is 19.84 kg/m  as calculated from the following:    Height as of 5/30/18: 1.6 m (5' 3\").    Weight as of 1/5/21: 50.8 kg (112 lb).   GENERAL APPEARANCE: alert, and oriented  MENTAL STATUS: alert  AIRWAY EXAM: Mallampatti Class I (visualization of the soft palate, fauces, uvula, anterior and posterior pillars)  RESP: lungs clear to auscultation - no rales, rhonchi or wheezes  CV: regular rates and rhythm  DIAGNOSTICS:    Not indicated    IMPRESSION   ASA Class 2 - Mild systemic disease    PLAN:   Plan for Colonoscopy with possible biopsy, possible polypectomy. We discussed the risks, benefits and alternatives and the patient wished to proceed.    The above has been forwarded to the consulting provider.      Signed Electronically by: Yusuf Martinez MD  October 25, 2022          "

## 2022-10-25 NOTE — DISCHARGE INSTRUCTIONS

## 2023-08-05 ENCOUNTER — HOSPITAL ENCOUNTER (EMERGENCY)
Facility: CLINIC | Age: 58
Discharge: HOME | End: 2023-08-05
Attending: EMERGENCY MEDICINE | Admitting: EMERGENCY MEDICINE
Payer: COMMERCIAL

## 2023-08-05 VITALS
TEMPERATURE: 97.4 F | SYSTOLIC BLOOD PRESSURE: 119 MMHG | RESPIRATION RATE: 18 BRPM | OXYGEN SATURATION: 100 % | HEART RATE: 53 BPM | DIASTOLIC BLOOD PRESSURE: 84 MMHG

## 2023-08-05 DIAGNOSIS — H53.8 BLURRED VISION: ICD-10-CM

## 2023-08-05 DIAGNOSIS — R55 NEAR SYNCOPE: Primary | ICD-10-CM

## 2023-08-05 DIAGNOSIS — R00.1 SINUS BRADYCARDIA: ICD-10-CM

## 2023-08-05 LAB
ALBUMIN SERPL BCG-MCNC: 4.3 G/DL (ref 3.5–5.2)
ALBUMIN UR-MCNC: NEGATIVE MG/DL
ALP SERPL-CCNC: 50 U/L (ref 35–104)
ALT SERPL W P-5'-P-CCNC: 24 U/L (ref 0–50)
ANION GAP SERPL CALCULATED.3IONS-SCNC: 8 MMOL/L (ref 7–15)
APPEARANCE UR: CLEAR
AST SERPL W P-5'-P-CCNC: 37 U/L (ref 0–45)
BASOPHILS # BLD AUTO: 0 10E3/UL (ref 0–0.2)
BASOPHILS NFR BLD AUTO: 1 %
BILIRUB DIRECT SERPL-MCNC: <0.2 MG/DL (ref 0–0.3)
BILIRUB SERPL-MCNC: 0.7 MG/DL
BILIRUB UR QL STRIP: NEGATIVE
BUN SERPL-MCNC: 26.6 MG/DL (ref 8–23)
CALCIUM SERPL-MCNC: 9 MG/DL (ref 8.6–10)
CHLORIDE SERPL-SCNC: 99 MMOL/L (ref 98–107)
COLOR UR AUTO: ABNORMAL
CREAT SERPL-MCNC: 0.92 MG/DL (ref 0.51–0.95)
DEPRECATED HCO3 PLAS-SCNC: 27 MMOL/L (ref 22–29)
EOSINOPHIL # BLD AUTO: 0.1 10E3/UL (ref 0–0.7)
EOSINOPHIL NFR BLD AUTO: 1 %
ERYTHROCYTE [DISTWIDTH] IN BLOOD BY AUTOMATED COUNT: 13.6 % (ref 10–15)
GFR SERPL CREATININE-BSD FRML MDRD: 71 ML/MIN/1.73M2
GLUCOSE SERPL-MCNC: 103 MG/DL (ref 70–99)
GLUCOSE UR STRIP-MCNC: NEGATIVE MG/DL
HCT VFR BLD AUTO: 36.7 % (ref 35–47)
HGB BLD-MCNC: 11.8 G/DL (ref 11.7–15.7)
HGB UR QL STRIP: ABNORMAL
HOLD SPECIMEN: NORMAL
HOLD SPECIMEN: NORMAL
IMM GRANULOCYTES # BLD: 0 10E3/UL
IMM GRANULOCYTES NFR BLD: 0 %
KETONES UR STRIP-MCNC: NEGATIVE MG/DL
LEUKOCYTE ESTERASE UR QL STRIP: NEGATIVE
LYMPHOCYTES # BLD AUTO: 1.4 10E3/UL (ref 0.8–5.3)
LYMPHOCYTES NFR BLD AUTO: 28 %
MAGNESIUM SERPL-MCNC: 1.8 MG/DL (ref 1.7–2.3)
MCH RBC QN AUTO: 29.9 PG (ref 26.5–33)
MCHC RBC AUTO-ENTMCNC: 32.2 G/DL (ref 31.5–36.5)
MCV RBC AUTO: 93 FL (ref 78–100)
MONOCYTES # BLD AUTO: 0.4 10E3/UL (ref 0–1.3)
MONOCYTES NFR BLD AUTO: 9 %
NEUTROPHILS # BLD AUTO: 3 10E3/UL (ref 1.6–8.3)
NEUTROPHILS NFR BLD AUTO: 61 %
NITRATE UR QL: NEGATIVE
NRBC # BLD AUTO: 0 10E3/UL
NRBC BLD AUTO-RTO: 0 /100
PH UR STRIP: 5.5 [PH] (ref 5–7)
PLATELET # BLD AUTO: 175 10E3/UL (ref 150–450)
POTASSIUM SERPL-SCNC: 3.9 MMOL/L (ref 3.4–5.3)
PROT SERPL-MCNC: 7 G/DL (ref 6.4–8.3)
RBC # BLD AUTO: 3.94 10E6/UL (ref 3.8–5.2)
RBC URINE: 1 /HPF
SODIUM SERPL-SCNC: 134 MMOL/L (ref 136–145)
SP GR UR STRIP: 1.01 (ref 1–1.03)
SQUAMOUS EPITHELIAL: 2 /HPF
TROPONIN T SERPL HS-MCNC: <6 NG/L
UROBILINOGEN UR STRIP-MCNC: NORMAL MG/DL
WBC # BLD AUTO: 4.9 10E3/UL (ref 4–11)
WBC URINE: <1 /HPF

## 2023-08-05 PROCEDURE — 85025 COMPLETE CBC W/AUTO DIFF WBC: CPT | Performed by: EMERGENCY MEDICINE

## 2023-08-05 PROCEDURE — 82248 BILIRUBIN DIRECT: CPT | Performed by: EMERGENCY MEDICINE

## 2023-08-05 PROCEDURE — 99284 EMERGENCY DEPT VISIT MOD MDM: CPT

## 2023-08-05 PROCEDURE — 81001 URINALYSIS AUTO W/SCOPE: CPT | Performed by: EMERGENCY MEDICINE

## 2023-08-05 PROCEDURE — 80048 BASIC METABOLIC PNL TOTAL CA: CPT | Performed by: EMERGENCY MEDICINE

## 2023-08-05 PROCEDURE — 84484 ASSAY OF TROPONIN QUANT: CPT | Performed by: EMERGENCY MEDICINE

## 2023-08-05 PROCEDURE — 36415 COLL VENOUS BLD VENIPUNCTURE: CPT | Performed by: EMERGENCY MEDICINE

## 2023-08-05 PROCEDURE — 80053 COMPREHEN METABOLIC PANEL: CPT | Performed by: EMERGENCY MEDICINE

## 2023-08-05 PROCEDURE — 93005 ELECTROCARDIOGRAM TRACING: CPT

## 2023-08-05 PROCEDURE — 83735 ASSAY OF MAGNESIUM: CPT | Performed by: EMERGENCY MEDICINE

## 2023-08-05 ASSESSMENT — ENCOUNTER SYMPTOMS
HEMATURIA: 0
NECK PAIN: 0
ABDOMINAL PAIN: 0
CHILLS: 0
NAUSEA: 0
EYE PAIN: 0
COUGH: 0
SEIZURES: 0
VOMITING: 0
PALPITATIONS: 0
LIGHT-HEADEDNESS: 1
PHOTOPHOBIA: 0
RHINORRHEA: 0
WEAKNESS: 0
NUMBNESS: 0
DIZZINESS: 1
HEADACHES: 0
SHORTNESS OF BREATH: 0
BACK PAIN: 0
CHEST TIGHTNESS: 0
FEVER: 0
DYSURIA: 0

## 2023-08-05 ASSESSMENT — ACTIVITIES OF DAILY LIVING (ADL): ADLS_ACUITY_SCORE: 33

## 2023-08-05 NOTE — ED NOTES
"EKG ordered in triage. Patient refuses EKG. States \"this isn't related to my heart. I think I would know if it was.\" Explained to patient that an EKG is a standard and important part of the work up for dizziness and syncope. Patient continues to refuse.   "

## 2023-08-05 NOTE — ED TRIAGE NOTES
Patient presents to the ED following a near syncopal episode. Patient states she was using the bathroom when she became very dizzy and almost passed out. Currently reports is symptoms free. Denies recent illness or injury.

## 2023-08-05 NOTE — ED PROVIDER NOTES
History     Chief Complaint:  Near syncopal episode     HPI   Bhakti Ibarra is a 58 year old female presents the emergency department with near syncope-like episode.  Patient states that roughly around 230 this afternoon, she was on the toilet midstream urinary void when she suddenly experienced blurry like vision in which she describes difficulty with focusing with both eyes, but right greater than left, and difficulty focusing up close rather than far. Patient states that this episode lasted roughly 15 minutes or so.  She has associated dizziness which she states was difficult to describe as she does not feel like she was going to pass out or felt like the room is spinning.  Patient states that she has never had similar symptoms in the past before.  No associated headaches, nausea, vomiting, weakness, numbness, or tingling.  Patient denies any visual field cuts or blackout of vision.  Patient however states that she has been doing a lot of running and walks 4 miles in the past weekend in the heat.  There is a question regarding how much patient is staying hydrated.  Additionally, per , patient usually drinks 3 cups of coffee before she goes on runs.  Patient denies any chest pain or shortness of breath.  No family history of sudden cardiac death.  History of DVT/PE.  No history of syncope.    Review of Systems   Constitutional:  Negative for chills and fever.   HENT:  Negative for congestion and rhinorrhea.    Eyes:  Positive for visual disturbance. Negative for photophobia and pain.   Respiratory:  Negative for cough, chest tightness and shortness of breath.    Cardiovascular:  Negative for chest pain and palpitations.   Gastrointestinal:  Negative for abdominal pain, nausea and vomiting.   Genitourinary:  Negative for dysuria and hematuria.   Musculoskeletal:  Negative for back pain, gait problem and neck pain.   Neurological:  Positive for dizziness and light-headedness. Negative for seizures,  syncope, weakness, numbness and headaches.       Independent Historian:   Patient and spouse    Review of External Notes:   8/25/22 office visit note    Medications:    bisacodyl (DULCOLAX) 5 MG EC tablet  lifitegrast (XIIDRA) 5 % opthalmic solution  nitroFURantoin macrocrystal-monohydrate (MACROBID) 100 MG capsule  polyethylene glycol (GOLYTELY) 236 g suspension  sertraline (ZOLOFT) 25 MG tablet        Past Medical History:    Past Medical History:   Diagnosis Date     No known health problems        Past Surgical History:    Past Surgical History:   Procedure Laterality Date     APPENDECTOMY       breast impant  2001     COLONOSCOPY N/A 10/25/2022    Procedure: COLONOSCOPY;  Surgeon: Yusuf Martinez MD;  Location: [facility] REMOVAL OF BREAST IMPLANT  2019        Physical Exam     Patient Vitals for the past 24 hrs:   BP Temp Pulse Resp SpO2   08/05/23 1819 -- -- -- -- 100 %   08/05/23 1818 -- -- -- -- 98 %   08/05/23 1817 -- -- -- -- 100 %   08/05/23 1816 -- -- -- -- 99 %   08/05/23 1815 -- -- -- -- 100 %   08/05/23 1807 -- -- -- -- 99 %   08/05/23 1806 -- -- -- -- 100 %   08/05/23 1805 -- -- -- -- 100 %   08/05/23 1804 -- -- -- -- 100 %   08/05/23 1803 -- -- -- -- 100 %   08/05/23 1802 -- -- -- -- 100 %   08/05/23 1801 -- -- -- -- 100 %   08/05/23 1752 119/84 -- -- -- 100 %   08/05/23 1528 (!) 149/89 97.4  F (36.3  C) 53 18 100 %        Physical Exam  Constitutional:       General: Not in acute distress.     Appearance: Normal appearance  HENT:      Head: Normocephalic and atraumatic.   Eyes:     Extraocular Movements: Extraocular movements intact.      Conjunctiva/sclera: Conjunctivae normal.      Pupils: Pupils are equal, round, and reactive to light.   Cardiovascular:     Rate and Rhythm: Normal rate and regular rhythm.   Pulmonary:      Effort: Pulmonary effort is normal.      Breath sounds: Normal breath sounds.   Abdominal:      General: Abdomen is flat. There is no distension.      Palpations:  Abdomen is soft.      Tenderness: There is no abdominal tenderness.   Musculoskeletal:      Cervical back: Normal range of motion and neck supple. No rigidity.      General: No swelling or deformity.   Skin:     General: Skin is warm and dry.   Neurological:      General: No focal deficit present.     NIHSS: Patient alert and keenly responsive. Able to answer month and age. Able and blink eyes and squeeze hands. No gaze palsy. No visual field deficits. No facial palsy. No arm drift bilaterally. No leg drift bilaterally. No limb ataxia. Able to complete finger nose finger and heel to shin. No sensory deficits. No language deficits/aphasia. No dysarthria. No extinction/inattention.     NIHSS score of 0.       Mental Status: Alert and oriented to person, place, and time.   Psychiatric:         Mood and Affect: Mood normal.         Behavior: Behavior normal.     Emergency Department Course   ECG  See ED course for independent interpretation.     Imaging:  Echocardiogram Complete    (Results Pending)      Report per radiology    Laboratory:  Labs Ordered and Resulted from Time of ED Arrival to Time of ED Departure   BASIC METABOLIC PANEL - Abnormal       Result Value    Sodium 134 (*)     Potassium 3.9      Chloride 99      Carbon Dioxide (CO2) 27      Anion Gap 8      Urea Nitrogen 26.6 (*)     Creatinine 0.92      Calcium 9.0      Glucose 103 (*)     GFR Estimate 71     ROUTINE UA WITH MICROSCOPIC REFLEX TO CULTURE - Abnormal    Color Urine Straw      Appearance Urine Clear      Glucose Urine Negative      Bilirubin Urine Negative      Ketones Urine Negative      Specific Gravity Urine 1.010      Blood Urine Trace (*)     pH Urine 5.5      Protein Albumin Urine Negative      Urobilinogen Urine Normal      Nitrite Urine Negative      Leukocyte Esterase Urine Negative      RBC Urine 1      WBC Urine <1      Squamous Epithelials Urine 2 (*)    TROPONIN T, HIGH SENSITIVITY - Normal    Troponin T, High Sensitivity <6      HEPATIC FUNCTION PANEL - Normal    Protein Total 7.0      Albumin 4.3      Bilirubin Total 0.7      Alkaline Phosphatase 50      AST 37      ALT 24      Bilirubin Direct <0.20     MAGNESIUM - Normal    Magnesium 1.8     CBC WITH PLATELETS AND DIFFERENTIAL    WBC Count 4.9      RBC Count 3.94      Hemoglobin 11.8      Hematocrit 36.7      MCV 93      MCH 29.9      MCHC 32.2      RDW 13.6      Platelet Count 175      % Neutrophils 61      % Lymphocytes 28      % Monocytes 9      % Eosinophils 1      % Basophils 1      % Immature Granulocytes 0      NRBCs per 100 WBC 0      Absolute Neutrophils 3.0      Absolute Lymphocytes 1.4      Absolute Monocytes 0.4      Absolute Eosinophils 0.1      Absolute Basophils 0.0      Absolute Immature Granulocytes 0.0      Absolute NRBCs 0.0          Emergency Department Course & Assessments:       Interventions:  Medications - No data to display     Independent Interpretation (X-rays, CTs, rhythm strip):  None    Assessments/Consultations/Discussion of Management or Tests:     ED Course as of 08/05/23 2241   Sat Aug 05, 2023   1719 Troponin T, High Sensitivity: <6   1756 EKG 12 lead  Sinus bradycardia rate of 48. Normal CA and QRS. Normal QTC. No acute ST elevation or depression. No prior ECG for comparison.   1822 Notified by nursing staff that patient wants to be discharged as soon as possible due to a wedding that they need to attend to.  I returned to the room and updated patient and  on lab and EKG findings.  No clear evidence of cause of episode today.  Suspect vasovagal episode versus ophthalmic migraine as most likely diagnosis. Asymptomatic at this time.  She states she is feeling better.  I was notified by nursing of sinus bradycardia as low as in the 40s while on the monitor in the ER although asymptomatic.  No evidence of heart block on EKG.  Will discharge patient with cardiology referral and echo referral order.  Advised for patient to return to the ER as soon  as possible if she has recurrence of symptoms given no clear diagnoses today.  Patient and  voiced understanding and agreement with plan.  Discussed return precautions.  Answered all questions.    Please see MDM below for details of conversation.       Social Determinants of Health affecting care:   None    Disposition:  The patient was discharged to home.     Impression & Plan    CMS Diagnoses: None    Medical Decision Makin-year-old female as described above presents to the emergency department for transient episode of vision changes that she describes as difficulty focusing with associated dizziness/lightheadedness while sitting on a toilet during urinary void.  Patient hemodynamically stable at time evaluation.  Afebrile.  NIHSS score of 0.  Based on history of recent decreased oral hydration, increase exercise/running over the weekend and heat exposure, and symptom onset while having urinary void, suspect vasovagal response possible cause of today's symptoms.  Additionally, ophthalmic migraine also within the differential given history of potential dehydration and increased caffeine usage.  Nonetheless, I did discuss with patient the possibility of TIA.  Offered patient MRI imaging of the brain in addition to MRA vessel studies for evaluation for CVA/TIA, but patient declined this at this time as her symptoms have completely resolved and she is feeling better.  I do not believe this is unreasonable given patient's lack of risk factors.  Additionally, given lack of headache, low suspicion for ICH/SAH.  I did offer patient CT imaging of the head for evaluation for the above discussed differential including screening for intracranial abnormalities such as mass as contributing cause to her symptoms today, but patient declined this as well.  Patient requesting expedited discharge at this time as they have a wedding to attend to this evening and patient is feeling better.  She declined IV fluids and  states that she has been drinking her bottle of water.  Given near syncope symptoms, I did discuss [place] syncope risk warning with the patient.  In order to qualify for scoring, we should evaluate for cardiac causes of patient's symptoms today which would include an EKG and troponin.  Patient agreed with lab work and EKG and urine, but declined further work-up at this time.  Upon review of patient's EKG, she does have sinus bradycardia with nursing reporting occasional heart rate as low as low 40s.  Patient is asymptomatic during these episodes while on the monitor.  Patient is a regular runner and in fact condition, suspect baseline slowed heart rate.  No evidence of heart block on EKG.  Nonetheless, there is some findings suggestive of potential LVH on EKG.  No family history of sudden cardiac death personal or hypertrophic cardiomyopathy.  Patient denies steroid/performance-enhancing drug usage.  While patient is low risk on [place] syncope risk scoring.  I offered patient cardiology referral in addition to outpatient echocardiogram given her findings from today.  Patient agreed with these referral orders.  I did offer patient potential Zio patch placement, but patient declined this as she states that she has a fitness watch at home that tracks her heart rate.  Patient is to follow-up outpatient with PCP in addition to cardiology. I discussed strict return precautions including should patient have recurrence of symptoms.  Patient is to return to the ER for for further expanded work-up.  Answered all questions.  Patient voiced understanding and agreement with plan.    Please refer to ED course above for details on the patient's treatment course and any changes or updates in care plan beyond my initial evaluation and MDM.      Diagnosis:    ICD-10-CM    1. Near syncope  R55 Follow-Up with Cardiology     Echocardiogram Complete      2. Blurred vision  H53.8       3. Sinus bradycardia  R00.1 Follow-Up with  Cardiology     Echocardiogram Complete           Discharge Medications:  Discharge Medication List as of 8/5/2023  6:18 PM           JESUS HURTADO DO  8/5/2023   Jesus Hurtado DO Yeh, DO Jesus  08/05/23 9181

## 2023-08-05 NOTE — DISCHARGE INSTRUCTIONS
Please follow-up with your primary care provider and/or specialist regarding your visit to the ER today.    Please return to the emergency department should you experience any of the symptoms we specifically discussed, including but not limited to recurrence or worsening of your symptoms, or development of any new and concerning symptoms such as persistent vision changes, blindness, weakness one side more than the other, numbness one side more than the other, chest pain, shortness of breath, passing out episodes, headache, nausea, or vomiting.

## 2023-08-07 LAB
ATRIAL RATE - MUSE: 48 BPM
DIASTOLIC BLOOD PRESSURE - MUSE: NORMAL MMHG
INTERPRETATION ECG - MUSE: NORMAL
P AXIS - MUSE: 0 DEGREES
PR INTERVAL - MUSE: 182 MS
QRS DURATION - MUSE: 86 MS
QT - MUSE: 484 MS
QTC - MUSE: 432 MS
R AXIS - MUSE: 31 DEGREES
SYSTOLIC BLOOD PRESSURE - MUSE: NORMAL MMHG
T AXIS - MUSE: 49 DEGREES
VENTRICULAR RATE- MUSE: 48 BPM

## 2023-08-10 ENCOUNTER — HOSPITAL ENCOUNTER (OUTPATIENT)
Dept: CARDIOLOGY | Facility: CLINIC | Age: 58
Discharge: HOME | End: 2023-08-10
Attending: EMERGENCY MEDICINE | Admitting: EMERGENCY MEDICINE
Payer: COMMERCIAL

## 2023-08-10 ENCOUNTER — OFFICE VISIT (OUTPATIENT)
Dept: INTERNAL MEDICINE | Facility: CLINIC | Age: 58
End: 2023-08-10
Payer: COMMERCIAL

## 2023-08-10 VITALS — SYSTOLIC BLOOD PRESSURE: 102 MMHG | DIASTOLIC BLOOD PRESSURE: 58 MMHG

## 2023-08-10 DIAGNOSIS — R55 VASOVAGAL SYNCOPE: Primary | ICD-10-CM

## 2023-08-10 DIAGNOSIS — R55 NEAR SYNCOPE: ICD-10-CM

## 2023-08-10 DIAGNOSIS — R00.1 SINUS BRADYCARDIA: ICD-10-CM

## 2023-08-10 LAB — LVEF ECHO: NORMAL

## 2023-08-10 PROCEDURE — 93306 TTE W/DOPPLER COMPLETE: CPT

## 2023-08-10 PROCEDURE — 99214 OFFICE O/P EST MOD 30 MIN: CPT | Performed by: INTERNAL MEDICINE

## 2023-08-10 PROCEDURE — 93306 TTE W/DOPPLER COMPLETE: CPT | Mod: 26 | Performed by: INTERNAL MEDICINE

## 2023-08-10 ASSESSMENT — PATIENT HEALTH QUESTIONNAIRE - PHQ9
10. IF YOU CHECKED OFF ANY PROBLEMS, HOW DIFFICULT HAVE THESE PROBLEMS MADE IT FOR YOU TO DO YOUR WORK, TAKE CARE OF THINGS AT HOME, OR GET ALONG WITH OTHER PEOPLE: NOT DIFFICULT AT ALL
SUM OF ALL RESPONSES TO PHQ QUESTIONS 1-9: 1
SUM OF ALL RESPONSES TO PHQ QUESTIONS 1-9: 1

## 2023-08-10 ASSESSMENT — ENCOUNTER SYMPTOMS
GASTROINTESTINAL NEGATIVE: 1
MUSCULOSKELETAL NEGATIVE: 1
WEAKNESS: 1
RESPIRATORY NEGATIVE: 1
CONSTITUTIONAL NEGATIVE: 1
CARDIOVASCULAR NEGATIVE: 1

## 2023-08-10 ASSESSMENT — PAIN SCALES - GENERAL: PAINLEVEL: NO PAIN (0)

## 2023-08-10 NOTE — PROGRESS NOTES
Assessment & Plan     Vasovagal syncope  At this time, I did spend an extensive amount of time reviewing her lab work and evaluation from her recent ER visit.  I did also spend a significant time reviewing her echocardiogram results with her.  I did discuss with her that there are no concerning structural abnormalities noted on the echocardiogram.  Her ejection fraction was also noted to be 55 to 60%.  Her recent lab test did not reveal any evidence of anemia, glucose abnormalities, or infectious etiologies that could have contributed to her symptoms.  Her EKG did show sinus bradycardia, but she does run frequently so a lower resting heart rate is not unexpected.  I did recommend that the neck step in her evaluation would be a Zio patch monitor to evaluate for any potential paroxysmal arrhythmias.  Patient states that she would prefer to consider the matter further before having any further diagnostic testing completed.  I did encourage her to hydrate well and try to avoid coffee prior to going on her bones as the caffeine could be contributing to dehydration as it is a diuretic.  Patient was also encouraged to avoid running in times of high heat.  We will contact the clinic should she wish to proceed with additional evaluation.    Review of external notes as documented elsewhere in note  30 minutes spent by me on the date of the encounter doing chart review, history and exam, documentation and further activities per the note       See Patient Instructions    Bharat Borja MD  [facility] BRAEDEN Ya is a 58 year old, presenting for the following health issues:  Establish Care        8/10/2023     4:22 PM   Additional Questions   Roomed by Celia Carson   Accompanied by self         8/10/2023     4:22 PM   Patient Reported Additional Medications   Patient reports taking the following new medications none       Patient is a 58-year-old female who presents to the clinic  to establish care and follow-up on a recent ER visit.  Patient was seen in the emergency department on August 5 after having a near syncopal-like episode.  Patient reports that she had been sitting on the toilet voiding her bladder when she suddenly experienced blurry like vision and difficulty with focusing both eyes.  Patient did feel as if she was going to pass out.  She did have dizziness associated with this particular episode.  She has never previously had episodes like this before.  Patient does state that she had been very active earlier that day.  She had ran several miles as she typically does in the heat.  Patient had also been working in her yard, and she did take another walk afterwards.  Patient also drank several cups of coffee earlier that day.  While in the emergency department, she did have an EKG that did show sinus bradycardia abnormal arrhythmias.  CBC, BMP, urinalysis, and troponins were unremarkable.  She did have an echocardiogram scheduled for the cardiac issues that could explain her symptoms.  Per the ER notes, patient also received a cardiology referral.  Patient did want to be discharged as quickly as possible due to having.  Since being discharged from hospital, patient has had no further episodes similar to what she experienced while in her bathroom.  She does state that she feels weak at times when she has been standing for too long.  She has continued to go her daily run since being seen in the emergency department.  Patient did have her echocardiogram completed, she does have a copy of the results with her today.  Patient states that she is very concerned.    History of Present Illness       Reason for visit:  Er follow  Symptom onset:  3-7 days ago  Symptoms include:  Will dicuss in office  Symptom intensity:  Moderate  Symptom progression:  Staying the same  Had these symptoms before:  No  What makes it worse:  Talk in office    She eats 4 or more servings of fruits and vegetables  daily.She consumes 0 sweetened beverage(s) daily.She exercises with enough effort to increase her heart rate 60 or more minutes per day.  She exercises with enough effort to increase her heart rate 7 days per week.   She is taking medications regularly.         Review of Systems   Constitutional: Negative.    HENT: Negative.     Respiratory: Negative.     Cardiovascular: Negative.    Gastrointestinal: Negative.    Genitourinary: Negative.    Musculoskeletal: Negative.    Neurological:  Positive for weakness.            Objective    /58 (BP Location: Right arm, Patient Position: Sitting, Cuff Size: Adult Regular)   LMP  (LMP Unknown)   There is no height or weight on file to calculate BMI.  Physical Exam  Vitals reviewed.   HENT:      Head: Normocephalic and atraumatic.      Mouth/Throat:      Mouth: Mucous membranes are moist.      Pharynx: Oropharynx is clear.   Eyes:      Extraocular Movements: Extraocular movements intact.      Conjunctiva/sclera: Conjunctivae normal.      Pupils: Pupils are equal, round, and reactive to light.   Cardiovascular:      Rate and Rhythm: Regular rhythm. Bradycardia present.   Pulmonary:      Effort: Pulmonary effort is normal.      Breath sounds: Normal breath sounds.   Skin:     General: Skin is warm and dry.      Capillary Refill: Capillary refill takes less than 2 seconds.   Neurological:      General: No focal deficit present.      Mental Status: She is alert and oriented to person, place, and time.

## 2023-08-10 NOTE — PATIENT INSTRUCTIONS
Establishing care.    Recommended Zio patch monitor for recent near syncopal episode.  Patient did wish to consider the matter further before deciding.

## 2023-08-11 ENCOUNTER — MYC MEDICAL ADVICE (OUTPATIENT)
Dept: INTERNAL MEDICINE | Facility: CLINIC | Age: 58
End: 2023-08-11
Payer: COMMERCIAL

## 2023-08-11 DIAGNOSIS — R55 NEAR SYNCOPE: Primary | ICD-10-CM

## 2023-08-14 NOTE — TELEPHONE ENCOUNTER
Please review Nuikut message and advise on next steps.     Angie Kearney RN  [facility]

## 2023-08-15 NOTE — TELEPHONE ENCOUNTER
Sent Plandree message to patient.    Td Aguilar, Triage RN [facility]  1:43 PM 8/15/2023

## 2023-08-26 ENCOUNTER — HEALTH MAINTENANCE LETTER (OUTPATIENT)
Age: 58
End: 2023-08-26

## 2023-08-29 ENCOUNTER — TELEPHONE (OUTPATIENT)
Dept: INTERNAL MEDICINE | Facility: CLINIC | Age: 58
End: 2023-08-29

## 2023-08-29 DIAGNOSIS — R55 NEAR SYNCOPE: Primary | ICD-10-CM

## 2023-08-29 NOTE — TELEPHONE ENCOUNTER
Patient calling. Its months before she can see cardiology but she wants to go ahead with the MRI of her brain and a zo patch to see if she has irregular heart beats. Please advise. Ok to call and eliana [phone]

## 2023-08-30 NOTE — TELEPHONE ENCOUNTER
We can start with a Zio patch as it may identify issues that may make the MRI unnecessary.  Zio patch has been ordered.

## 2023-08-30 NOTE — TELEPHONE ENCOUNTER
Called and left patient a voice mail message on August 30, 2023 9:19 AM to call back the clinic.    Thank you,  Td Aguilar, Triage RN [facility]  9:19 AM 8/30/2023

## 2023-08-31 ENCOUNTER — HOSPITAL ENCOUNTER (OUTPATIENT)
Dept: CARDIOLOGY | Facility: CLINIC | Age: 58
Discharge: HOME | End: 2023-08-31
Attending: INTERNAL MEDICINE | Admitting: INTERNAL MEDICINE
Payer: COMMERCIAL

## 2023-08-31 DIAGNOSIS — R55 NEAR SYNCOPE: ICD-10-CM

## 2023-08-31 PROCEDURE — 93242 EXT ECG>48HR<7D RECORDING: CPT

## 2023-08-31 PROCEDURE — 93248 EXT ECG>7D<15D REV&INTERPJ: CPT | Performed by: INTERNAL MEDICINE

## 2023-09-05 NOTE — TELEPHONE ENCOUNTER
Per chart review, appears patient picked up Zio Patch on 8/31/23. Called patient and she confirms that she did  the monitor. She is in agreement with completing the monitor first to see if MRI is needed.     Patient would like Dr. Borja to know that she now is questioning if low blood sugars are triggering her symptoms. She runs regularly, but eating in the mornings upsets her stomach when she is running. Informed patient that she could purchase a glucose monitor out of pocket to monitor glucoses as well to see if they are low. Patient will consider doing this.     She also will likely cancel or postpone Cardiology appointment until Ziopatch monitor results are received to see if Cardiology appointment is necessary.     Angie Kearney RN  [facility]

## 2023-09-19 ENCOUNTER — TELEPHONE (OUTPATIENT)
Dept: INTERNAL MEDICINE | Facility: CLINIC | Age: 58
End: 2023-09-19
Payer: COMMERCIAL

## 2023-09-19 NOTE — TELEPHONE ENCOUNTER
"Patient saw Zio patch results via My Chart and finds the results \"concerning\".  She knows Dr. Borja is out of the office today but would like a call from Dr. Borja when he returns to the clinic tomorrow Wed.  HEATHER Falk R.N.    "

## 2023-09-28 ENCOUNTER — OFFICE VISIT (OUTPATIENT)
Dept: CARDIOLOGY | Facility: CLINIC | Age: 58
End: 2023-09-28
Payer: COMMERCIAL

## 2023-09-28 VITALS
DIASTOLIC BLOOD PRESSURE: 76 MMHG | SYSTOLIC BLOOD PRESSURE: 122 MMHG | HEART RATE: 56 BPM | HEIGHT: 63 IN | OXYGEN SATURATION: 100 % | BODY MASS INDEX: 18.95 KG/M2

## 2023-09-28 DIAGNOSIS — R55 PRE-SYNCOPE: Primary | ICD-10-CM

## 2023-09-28 DIAGNOSIS — R55 NEAR SYNCOPE: ICD-10-CM

## 2023-09-28 PROCEDURE — 99204 OFFICE O/P NEW MOD 45 MIN: CPT | Performed by: INTERNAL MEDICINE

## 2023-09-28 RX ORDER — LACTOBACILLUS RHAMNOSUS GG 10B CELL
1 CAPSULE ORAL 2 TIMES DAILY
COMMUNITY

## 2023-09-28 RX ORDER — OMEGA-3/DHA/EPA/FISH OIL 60 MG-90MG
CAPSULE ORAL
COMMUNITY

## 2023-09-28 NOTE — LETTER
9/28/2023    Bharat Borja MD  [address] [facility] [address]    RE: Bhakti Ibarra       Dear Colleague,     I had the pleasure of seeing Bhakti Ibarra in the [facility].  CARDIOLOGY CLINIC CONSULT    REASON FOR CONSULT:     PRIMARY CARE PHYSICIAN:  Bahrat Borja    HISTORY OF PRESENT ILLNESS:    Bhakti Ibarra is a very nice 58-year-old female patient here for evaluation of near syncope.  She was in the emergency department on August 5, 2023 for lightheadedness dizziness and visual disturbance.    She was urinating and started having a very strange uncomfortable feeling like she was lightheaded and sort of like her head was pounding.      For the next 3 weeks she noticed that she was lightheaded very frequently.  This was not during her ziopatch monitor when she tells me she was asymptomatic.      She runs 40 miles/week and has been doing that for over 40 yrs including when she was pregnant.  She typically tolerates running in warm weather. 4 days before her event in August she did a really long run in really hot weather and cut the grass and was very active all day and did not take any extra hydration or nutrition. She has coffee every morning. Sometimes after she runs she has floaters in her vision. She tries to follow a low sodium diet.  She does restrict her intake although she does not feel she is overly restrictive.  She thinks she usually weighs less in the summer and gains around 5 pounds in the winter.    She subsequently had an echocardiogram on 8/10/2020  Echo result w/o MOPS: Interpretation Summary Left ventricular systolic function is normal.The visual ejection fraction is55-60%.The right ventricular systolic function is normal.The right ventricle is mildly dilated.There is trace aortic regurgitation.    A Zio patch monitor was 6 hrs 20 minutes of readable data done in August 2023 showed heart rate 21-1 41 with average of 62 predominantly sinus  rhythm with first-degree AV block.  There were 9 short runs of SVT the longest being 13.2 seconds with a max rate of 141 and an episode of Wenckebach with low heart rate likely occurring during sleep around between 1 and 2:00 in the morning.    PAST MEDICAL HISTORY:  Past Medical History:   Diagnosis Date    No known health problems        MEDICATIONS:  Current Outpatient Medications   Medication    fish oil-omega-3 fatty acids 500 MG capsule    lactobacillus rhamnosus, GG, (CULTURELL) capsule    lifitegrast (XIIDRA) 5 % opthalmic solution    Multiple Vitamins-Minerals (MULTIVITAMIN WOMEN 50+ PO)    sertraline (ZOLOFT) 25 MG tablet    UNABLE TO FIND     No current facility-administered medications for this visit.       ALLERGIES:  No Known Allergies    SOCIAL HISTORY:  I have reviewed this patient's social history and updated it with pertinent information if needed. Bhakti Ibarra  reports that she has never smoked. She has never used smokeless tobacco. She reports current alcohol use. She reports that she does not use drugs.  Also see HPI.  She is .  She drinks very little.  Does not use any other drugs.  Drinks 2 cups of coffee in the morning typically.    FAMILY HISTORY:  I have reviewed this patient's family history and updated it with pertinent information if needed.   Family History   Problem Relation Age of Onset    Breast Cancer Mother        REVIEW OF SYSTEMS:  Skin:        Eyes:       ENT:       Respiratory:  Negative    Cardiovascular:    Positive for  Gastroenterology:      Genitourinary:       Musculoskeletal:       Neurologic:       Psychiatric:       Heme/Lymph/Imm:       Endocrine:         PHYSICAL EXAM:      BP: 122/76 Pulse: 56     SpO2: 100 %      Vital Signs with Ranges  Pulse:  [56] 56  BP: (122)/(76) 122/76  SpO2:  [100 %] 100 %  0 lbs 0 oz    Constitutional: awake, alert, no distress very small body habitus very thin.  Denies to have weight check today.  Eyes: sclera nonicteric  ENT:  trachea midline  Respiratory: Clear to auscultation bilaterally  Cardiovascular: Regular rate and rhythm no murmur rub or gallop  GI: nondistended, nontender, bowel sounds present  Skin: dry, no rash no edema  Musculoskeletal: grossly normal muscle bulk and tone  Neuropsychiatric: appropriate affect      DATA:   Reviewed last lipids at [facility] done in 2020.    LAST BMP:  Lab Results   Component Value Date     08/05/2023      Lab Results   Component Value Date    POTASSIUM 3.9 08/05/2023    POTASSIUM 3.6 08/25/2022     Lab Results   Component Value Date    CHLORIDE 99 08/05/2023    CHLORIDE 106 08/25/2022     Lab Results   Component Value Date    FLYNN 9.0 08/05/2023     Lab Results   Component Value Date    CO2 27 08/05/2023    CO2 29 08/25/2022     Lab Results   Component Value Date    BUN 26.6 08/05/2023    BUN 13 08/25/2022     Lab Results   Component Value Date    CR 0.92 08/05/2023     Lab Results   Component Value Date     08/05/2023     08/25/2022       LAST CBC:  Lab Results   Component Value Date    WBC 4.9 08/05/2023     Lab Results   Component Value Date    RBC 3.94 08/05/2023     Lab Results   Component Value Date    HGB 11.8 08/05/2023    HGB 12.0 01/05/2021     Lab Results   Component Value Date    HCT 36.7 08/05/2023     Lab Results   Component Value Date    MCV 93 08/05/2023     Lab Results   Component Value Date    MCH 29.9 08/05/2023     Lab Results   Component Value Date    MCHC 32.2 08/05/2023     Lab Results   Component Value Date    RDW 13.6 08/05/2023     Lab Results   Component Value Date     08/05/2023     TSH   Date Value Ref Range Status   01/05/2021 2.47 0.40 - 4.00 mU/L Final         EKG 8/5/23  sinus bradycardia otherwisenormal    EchoEcho result w/o MOPS: Interpretation Summary Left ventricular systolic function is normal.The visual ejection fraction is55-60%.The right ventricular systolic function is normal.The right ventricle is mildly dilated.There is trace  aortic regurgitation.  I personally reviewed her echocardiogram and her RV appears to be normal size on my review.       ASSESSMENT:  Presyncope during micturition in August 2023.  Episodic presyncope for several weeks following that event.  Suspect this may have been due to dehydration or possibly vertigo or subclinical viral illness or a combination thereof.  Symptoms have completely resolved.  Short runs of asymptomatic supraventricular tachycardia and second-degree type I heart block during sleep also asymptomatic noted on Zio patch monitor.   RV size appears normal on my review alternatively if it is borderline enlarged chamber enlargement can be seen in endurance athletes.       RECOMMENDATIONS:  No additional cardiovascular evaluation or treatm she does not like electrolyte replacement products although she does drink coconut water.  ent recommended at this time.  With her relatively low blood pressure and heart rate she would probably not tolerate AV node blockade and she is not symptomatic with the short episodes of SVT she had during her monitored period.  Encouraged her to maintain good hydration and discussed the need for additional electrolyte intake especially during periods of prolonged as an greater than 1 hour and intense exercise.  Her low intake and high exercise regimen are suspicious for a form of disordered eating or anorexia and she also declined to have weight measured in clinic today.  I did express my concern that body weight may be or approaching being too low although I do not know how much she weighs today.  Her low-sodium diet and very heavy exercise regimen may very well have contributed to her symptoms of near syncope.  Also encouraged maintenance of healthy body weight.  Inadequate nutrition and hydration can result in cardiac rhythm disturbance even in a normal heart.  Cardiology follow-up in 1 year or as needed.    Brittney Butts MD [facility] Heart  Text Page       Thank you for  allowing me to participate in the care of your patient.      Sincerely,     Brittney Butts MD     [facility]  cc:   Bharat Borja MD  [facility] [place] [address] [place] [address]

## 2023-09-28 NOTE — PROGRESS NOTES
CARDIOLOGY CLINIC CONSULT    REASON FOR CONSULT:     PRIMARY CARE PHYSICIAN:  Bharat Borja    HISTORY OF PRESENT ILLNESS:    Bhakti Ibarra is a very nice 58-year-old female patient here for evaluation of near syncope.  She was in the emergency department on August 5, 2023 for lightheadedness dizziness and visual disturbance.    She was urinating and started having a very strange uncomfortable feeling like she was lightheaded and sort of like her head was pounding.      For the next 3 weeks she noticed that she was lightheaded very frequently.  This was not during her ziopatch monitor when she tells me she was asymptomatic.      She runs 40 miles/week and has been doing that for over 40 yrs including when she was pregnant.  She typically tolerates running in warm weather. 4 days before her event in August she did a really long run in really hot weather and cut the grass and was very active all day and did not take any extra hydration or nutrition. She has coffee every morning. Sometimes after she runs she has floaters in her vision. She tries to follow a low sodium diet.  She does restrict her intake although she does not feel she is overly restrictive.  She thinks she usually weighs less in the summer and gains around 5 pounds in the winter.    She subsequently had an echocardiogram on 8/10/2020  Echo result w/o MOPS: Interpretation Summary Left ventricular systolic function is normal.The visual ejection fraction is55-60%.The right ventricular systolic function is normal.The right ventricle is mildly dilated.There is trace aortic regurgitation.    A Zio patch monitor was 6 hrs 20 minutes of readable data done in August 2023 showed heart rate 21-1 41 with average of 62 predominantly sinus rhythm with first-degree AV block.  There were 9 short runs of SVT the longest being 13.2 seconds with a max rate of 141 and an episode of Wenckebach with low heart rate likely occurring during sleep around between 1  and 2:00 in the morning.    PAST MEDICAL HISTORY:  Past Medical History:   Diagnosis Date    No known health problems        MEDICATIONS:  Current Outpatient Medications   Medication    fish oil-omega-3 fatty acids 500 MG capsule    lactobacillus rhamnosus, GG, (CULTURELL) capsule    lifitegrast (XIIDRA) 5 % opthalmic solution    Multiple Vitamins-Minerals (MULTIVITAMIN WOMEN 50+ PO)    sertraline (ZOLOFT) 25 MG tablet    UNABLE TO FIND     No current facility-administered medications for this visit.       ALLERGIES:  No Known Allergies    SOCIAL HISTORY:  I have reviewed this patient's social history and updated it with pertinent information if needed. Bhakti Ibarra  reports that she has never smoked. She has never used smokeless tobacco. She reports current alcohol use. She reports that she does not use drugs.  Also see HPI.  She is .  She drinks very little.  Does not use any other drugs.  Drinks 2 cups of coffee in the morning typically.    FAMILY HISTORY:  I have reviewed this patient's family history and updated it with pertinent information if needed.   Family History   Problem Relation Age of Onset    Breast Cancer Mother        REVIEW OF SYSTEMS:  Skin:        Eyes:       ENT:       Respiratory:  Negative    Cardiovascular:    Positive for  Gastroenterology:      Genitourinary:       Musculoskeletal:       Neurologic:       Psychiatric:       Heme/Lymph/Imm:       Endocrine:         PHYSICAL EXAM:      BP: 122/76 Pulse: 56     SpO2: 100 %      Vital Signs with Ranges  Pulse:  [56] 56  BP: (122)/(76) 122/76  SpO2:  [100 %] 100 %  0 lbs 0 oz    Constitutional: awake, alert, no distress very small body habitus very thin.  Denies to have weight check today.  Eyes: sclera nonicteric  ENT: trachea midline  Respiratory: Clear to auscultation bilaterally  Cardiovascular: Regular rate and rhythm no murmur rub or gallop  GI: nondistended, nontender, bowel sounds present  Skin: dry, no rash no  edema  Musculoskeletal: grossly normal muscle bulk and tone  Neuropsychiatric: appropriate affect      DATA:   Reviewed last lipids at [facility] done in 2020.    LAST BMP:  Lab Results   Component Value Date     08/05/2023      Lab Results   Component Value Date    POTASSIUM 3.9 08/05/2023    POTASSIUM 3.6 08/25/2022     Lab Results   Component Value Date    CHLORIDE 99 08/05/2023    CHLORIDE 106 08/25/2022     Lab Results   Component Value Date    FLYNN 9.0 08/05/2023     Lab Results   Component Value Date    CO2 27 08/05/2023    CO2 29 08/25/2022     Lab Results   Component Value Date    BUN 26.6 08/05/2023    BUN 13 08/25/2022     Lab Results   Component Value Date    CR 0.92 08/05/2023     Lab Results   Component Value Date     08/05/2023     08/25/2022       LAST CBC:  Lab Results   Component Value Date    WBC 4.9 08/05/2023     Lab Results   Component Value Date    RBC 3.94 08/05/2023     Lab Results   Component Value Date    HGB 11.8 08/05/2023    HGB 12.0 01/05/2021     Lab Results   Component Value Date    HCT 36.7 08/05/2023     Lab Results   Component Value Date    MCV 93 08/05/2023     Lab Results   Component Value Date    MCH 29.9 08/05/2023     Lab Results   Component Value Date    MCHC 32.2 08/05/2023     Lab Results   Component Value Date    RDW 13.6 08/05/2023     Lab Results   Component Value Date     08/05/2023     TSH   Date Value Ref Range Status   01/05/2021 2.47 0.40 - 4.00 mU/L Final         EKG 8/5/23  sinus bradycardia otherwisenormal    EchoEcho result w/o MOPS: Interpretation Summary Left ventricular systolic function is normal.The visual ejection fraction is55-60%.The right ventricular systolic function is normal.The right ventricle is mildly dilated.There is trace aortic regurgitation.  I personally reviewed her echocardiogram and her RV appears to be normal size on my review.       ASSESSMENT:  Presyncope during micturition in August 2023.  Episodic presyncope for  several weeks following that event.  Suspect this may have been due to dehydration or possibly vertigo or subclinical viral illness or a combination thereof.  Symptoms have completely resolved.  Short runs of asymptomatic supraventricular tachycardia and second-degree type I heart block during sleep also asymptomatic noted on Zio patch monitor.   RV size appears normal on my review alternatively if it is borderline enlarged chamber enlargement can be seen in endurance athletes.       RECOMMENDATIONS:  No additional cardiovascular evaluation or treatm she does not like electrolyte replacement products although she does drink coconut water.  ent recommended at this time.  With her relatively low blood pressure and heart rate she would probably not tolerate AV node blockade and she is not symptomatic with the short episodes of SVT she had during her monitored period.  Encouraged her to maintain good hydration and discussed the need for additional electrolyte intake especially during periods of prolonged as an greater than 1 hour and intense exercise.  Her low intake and high exercise regimen are suspicious for a form of disordered eating or anorexia and she also declined to have weight measured in clinic today.  I did express my concern that body weight may be or approaching being too low although I do not know how much she weighs today.  Her low-sodium diet and very heavy exercise regimen may very well have contributed to her symptoms of near syncope.  Also encouraged maintenance of healthy body weight.  Inadequate nutrition and hydration can result in cardiac rhythm disturbance even in a normal heart.  Cardiology follow-up in 1 year or as needed.    Brittney Butts MD [facility]  Text Page

## 2023-10-10 ENCOUNTER — TELEPHONE (OUTPATIENT)
Dept: CARDIOLOGY | Facility: CLINIC | Age: 58
End: 2023-10-10
Payer: COMMERCIAL

## 2023-10-10 NOTE — TELEPHONE ENCOUNTER
Health Call Center    Phone Message    May a detailed message be left on voicemail: yes     Reason for Call: Other: Patient states that her lightheadedness is still going on. She would like to speak to a nurse about this. Please call the patient to discuss.      Action Taken: Other: cardiology    Travel Screening: Not Applicable  Thank you!  [facility]

## 2023-10-10 NOTE — TELEPHONE ENCOUNTER
Called pt, no answer. Left  requesting call back to Team 1.     Addendum 10/10/23 - Received return call from pt. Last visit on 9/28/23 with Dr. Butts, per note pt's symptoms of lightheadedness had resolved. Per pt her symptoms come and go but have not resolved. Pt stated sometimes she feels good but she feels she is having more bad than good days at this point. Pt noted it is mainly when she stands for long periods of time that she gets lightheaded. Pt stated she is a runner and she feels OK and does not experience the lightheadedness while she is running. Pt stated she believes she is eating enough calories and drinking plenty of fluids and does not believe this is the problem. Pt stated she estimates she eats between 4430-1277 calories daily. Pt does not monitor BP at home. Pt is wondering if there are any next steps that Dr. Butts would recommend for her lightheadedness and if she needs to see EP for her SVT. Routing to Dr. Butts to review.

## 2023-10-12 NOTE — TELEPHONE ENCOUNTER
Received response from Dr. Butts:     Brittney Butts MD  [facility] Heart Team 1  Caller: Unspecified (2 days ago, 10:08 AM)  I am sorry to hear she is not feeling well.  I suspect the symptoms are likely primarily due to SVT.   Initial therapy for SVT is typically medication, and I think she would feel significantly worse with a medication.    I recommend follow up first with Dr. Borja. We can schedule cardiology follow up in about 2 months. It would be helpful if she was able to keep a log of her blood pressure, pulse and weight at home (daily or at least weekly log), and bring that in for a cardiology follow up visit.    Brittney Butts MD on 10/12/2023 at 2:43 PM    Called pt, reviewed recommendations. Pt verbalized understanding and agreement with plan. Pt stated she does not have a BP cuff at this time but she will purchase one and start keeping a log. Offered appointment with Dr. Butts in [place] on 12/12/23 at 12:15 pm and pt agreed. Message sent to scheduling.

## 2024-04-12 ENCOUNTER — HOSPITAL ENCOUNTER (EMERGENCY)
Facility: CLINIC | Age: 59
Discharge: HOME | End: 2024-04-12
Attending: EMERGENCY MEDICINE | Admitting: EMERGENCY MEDICINE
Payer: COMMERCIAL

## 2024-04-12 ENCOUNTER — OFFICE VISIT (OUTPATIENT)
Dept: URGENT CARE | Facility: URGENT CARE | Age: 59
End: 2024-04-12
Payer: COMMERCIAL

## 2024-04-12 ENCOUNTER — APPOINTMENT (OUTPATIENT)
Dept: CT IMAGING | Facility: CLINIC | Age: 59
End: 2024-04-12
Attending: EMERGENCY MEDICINE
Payer: COMMERCIAL

## 2024-04-12 VITALS
TEMPERATURE: 98.6 F | HEART RATE: 66 BPM | OXYGEN SATURATION: 98 % | DIASTOLIC BLOOD PRESSURE: 69 MMHG | RESPIRATION RATE: 18 BRPM | SYSTOLIC BLOOD PRESSURE: 119 MMHG

## 2024-04-12 VITALS
RESPIRATION RATE: 16 BRPM | WEIGHT: 110 LBS | BODY MASS INDEX: 19.49 KG/M2 | SYSTOLIC BLOOD PRESSURE: 126 MMHG | OXYGEN SATURATION: 98 % | HEIGHT: 63 IN | HEART RATE: 62 BPM | DIASTOLIC BLOOD PRESSURE: 82 MMHG | TEMPERATURE: 98.4 F

## 2024-04-12 DIAGNOSIS — R10.32 LLQ ABDOMINAL PAIN: ICD-10-CM

## 2024-04-12 DIAGNOSIS — K57.32 SIGMOID DIVERTICULITIS: ICD-10-CM

## 2024-04-12 DIAGNOSIS — R10.31 RLQ ABDOMINAL PAIN: Primary | ICD-10-CM

## 2024-04-12 DIAGNOSIS — K59.00 CONSTIPATION, UNSPECIFIED CONSTIPATION TYPE: ICD-10-CM

## 2024-04-12 LAB
ALBUMIN SERPL BCG-MCNC: 4.4 G/DL (ref 3.5–5.2)
ALBUMIN UR-MCNC: NEGATIVE MG/DL
ALP SERPL-CCNC: 59 U/L (ref 40–150)
ALT SERPL W P-5'-P-CCNC: 22 U/L (ref 0–50)
ANION GAP SERPL CALCULATED.3IONS-SCNC: 13 MMOL/L (ref 7–15)
APPEARANCE UR: CLEAR
AST SERPL W P-5'-P-CCNC: 29 U/L (ref 0–45)
BACTERIA #/AREA URNS HPF: ABNORMAL /HPF
BASOPHILS # BLD AUTO: 0 10E3/UL (ref 0–0.2)
BASOPHILS NFR BLD AUTO: 0 %
BILIRUB SERPL-MCNC: 0.8 MG/DL
BILIRUB UR QL STRIP: NEGATIVE
BUN SERPL-MCNC: 23.4 MG/DL (ref 8–23)
CALCIUM SERPL-MCNC: 9.2 MG/DL (ref 8.8–10.2)
CHLORIDE SERPL-SCNC: 99 MMOL/L (ref 98–107)
COLOR UR AUTO: ABNORMAL
CREAT SERPL-MCNC: 0.65 MG/DL (ref 0.51–0.95)
DEPRECATED HCO3 PLAS-SCNC: 24 MMOL/L (ref 22–29)
EGFRCR SERPLBLD CKD-EPI 2021: >90 ML/MIN/1.73M2
EOSINOPHIL # BLD AUTO: 0.1 10E3/UL (ref 0–0.7)
EOSINOPHIL NFR BLD AUTO: 1 %
ERYTHROCYTE [DISTWIDTH] IN BLOOD BY AUTOMATED COUNT: 14.2 % (ref 10–15)
GLUCOSE SERPL-MCNC: 101 MG/DL (ref 70–99)
GLUCOSE UR STRIP-MCNC: NEGATIVE MG/DL
HCT VFR BLD AUTO: 35.1 % (ref 35–47)
HGB BLD-MCNC: 11.6 G/DL (ref 11.7–15.7)
HGB UR QL STRIP: ABNORMAL
HOLD SPECIMEN: NORMAL
HOLD SPECIMEN: NORMAL
IMM GRANULOCYTES # BLD: 0 10E3/UL
IMM GRANULOCYTES NFR BLD: 0 %
KETONES UR STRIP-MCNC: NEGATIVE MG/DL
LEUKOCYTE ESTERASE UR QL STRIP: NEGATIVE
LIPASE SERPL-CCNC: 40 U/L (ref 13–60)
LYMPHOCYTES # BLD AUTO: 1.1 10E3/UL (ref 0.8–5.3)
LYMPHOCYTES NFR BLD AUTO: 11 %
MCH RBC QN AUTO: 29.7 PG (ref 26.5–33)
MCHC RBC AUTO-ENTMCNC: 33 G/DL (ref 31.5–36.5)
MCV RBC AUTO: 90 FL (ref 78–100)
MONOCYTES # BLD AUTO: 0.9 10E3/UL (ref 0–1.3)
MONOCYTES NFR BLD AUTO: 9 %
NEUTROPHILS # BLD AUTO: 7.9 10E3/UL (ref 1.6–8.3)
NEUTROPHILS NFR BLD AUTO: 79 %
NITRATE UR QL: NEGATIVE
NRBC # BLD AUTO: 0 10E3/UL
NRBC BLD AUTO-RTO: 0 /100
PH UR STRIP: 5 [PH] (ref 5–7)
PLATELET # BLD AUTO: 157 10E3/UL (ref 150–450)
POTASSIUM SERPL-SCNC: 4 MMOL/L (ref 3.4–5.3)
PROT SERPL-MCNC: 7.2 G/DL (ref 6.4–8.3)
RBC # BLD AUTO: 3.91 10E6/UL (ref 3.8–5.2)
RBC URINE: 2 /HPF
SODIUM SERPL-SCNC: 136 MMOL/L (ref 135–145)
SP GR UR STRIP: 1.01 (ref 1–1.03)
SQUAMOUS EPITHELIAL: 1 /HPF
UROBILINOGEN UR STRIP-MCNC: NORMAL MG/DL
WBC # BLD AUTO: 9.9 10E3/UL (ref 4–11)
WBC URINE: <1 /HPF

## 2024-04-12 PROCEDURE — 74177 CT ABD & PELVIS W/CONTRAST: CPT

## 2024-04-12 PROCEDURE — 99285 EMERGENCY DEPT VISIT HI MDM: CPT | Mod: 25

## 2024-04-12 PROCEDURE — 36415 COLL VENOUS BLD VENIPUNCTURE: CPT | Performed by: EMERGENCY MEDICINE

## 2024-04-12 PROCEDURE — 258N000003 HC RX IP 258 OP 636: Performed by: EMERGENCY MEDICINE

## 2024-04-12 PROCEDURE — 250N000011 HC RX IP 250 OP 636: Performed by: EMERGENCY MEDICINE

## 2024-04-12 PROCEDURE — 85025 COMPLETE CBC W/AUTO DIFF WBC: CPT | Performed by: EMERGENCY MEDICINE

## 2024-04-12 PROCEDURE — 83690 ASSAY OF LIPASE: CPT | Performed by: EMERGENCY MEDICINE

## 2024-04-12 PROCEDURE — 81001 URINALYSIS AUTO W/SCOPE: CPT | Performed by: EMERGENCY MEDICINE

## 2024-04-12 PROCEDURE — 80053 COMPREHEN METABOLIC PANEL: CPT | Performed by: EMERGENCY MEDICINE

## 2024-04-12 PROCEDURE — 99207 PR NO CHARGE LOS: CPT | Performed by: FAMILY MEDICINE

## 2024-04-12 PROCEDURE — 250N000009 HC RX 250: Performed by: EMERGENCY MEDICINE

## 2024-04-12 PROCEDURE — 250N000013 HC RX MED GY IP 250 OP 250 PS 637: Performed by: EMERGENCY MEDICINE

## 2024-04-12 PROCEDURE — 96360 HYDRATION IV INFUSION INIT: CPT | Mod: 59

## 2024-04-12 RX ORDER — SENNOSIDES A AND B 8.6 MG/1
2 TABLET, FILM COATED ORAL 2 TIMES DAILY PRN
Qty: 30 TABLET | Refills: 0 | Status: SHIPPED | OUTPATIENT
Start: 2024-04-12 | End: 2024-04-22

## 2024-04-12 RX ORDER — POLYETHYLENE GLYCOL 3350 17 G/17G
POWDER, FOR SOLUTION ORAL
Qty: 527 G | Refills: 0 | Status: SHIPPED | OUTPATIENT
Start: 2024-04-12

## 2024-04-12 RX ORDER — IOPAMIDOL 755 MG/ML
120 INJECTION, SOLUTION INTRAVASCULAR ONCE
Status: COMPLETED | OUTPATIENT
Start: 2024-04-12 | End: 2024-04-12

## 2024-04-12 RX ADMIN — SODIUM CHLORIDE 54 ML: 9 INJECTION, SOLUTION INTRAVENOUS at 21:59

## 2024-04-12 RX ADMIN — IOPAMIDOL 54 ML: 755 INJECTION, SOLUTION INTRAVENOUS at 21:56

## 2024-04-12 RX ADMIN — SODIUM CHLORIDE 1000 ML: 9 INJECTION, SOLUTION INTRAVENOUS at 21:24

## 2024-04-12 RX ADMIN — AMOXICILLIN AND CLAVULANATE POTASSIUM 1 TABLET: 875; 125 TABLET, FILM COATED ORAL at 22:48

## 2024-04-12 ASSESSMENT — COLUMBIA-SUICIDE SEVERITY RATING SCALE - C-SSRS
1. IN THE PAST MONTH, HAVE YOU WISHED YOU WERE DEAD OR WISHED YOU COULD GO TO SLEEP AND NOT WAKE UP?: NO
6. HAVE YOU EVER DONE ANYTHING, STARTED TO DO ANYTHING, OR PREPARED TO DO ANYTHING TO END YOUR LIFE?: NO
2. HAVE YOU ACTUALLY HAD ANY THOUGHTS OF KILLING YOURSELF IN THE PAST MONTH?: NO

## 2024-04-12 ASSESSMENT — ACTIVITIES OF DAILY LIVING (ADL)
ADLS_ACUITY_SCORE: 35
ADLS_ACUITY_SCORE: 33

## 2024-04-13 NOTE — ED PROVIDER NOTES
"  History     Chief Complaint:  Abdominal Pain       HPI     Bhakti Ibarra is a 59 year old female who presents with abdominal pain. Patient reports experiencing pain in her lower stomach gradually starting a couple of days ago. The discomfort got worse last night to the point where she couldn't fall asleep. Patient states it doesn't feel like sharp pain. Patient recently got back from travel [place] Patient has a history of diverticulosis. Patient also reports feeling constipated. Her last bowel movement was yesterday, but she reports it was not at the normal level. She has had an appendectomy. Patient denies nausea, emesis, diarrhea, dysuria or fever.     Independent Historian:   None - Patient Only    Review of External Notes:   None    Medications:    fish oil-omega-3 fatty acids 500 MG capsule  lactobacillus rhamnosus, GG, (CULTURELL) capsule  lifitegrast (XIIDRA) 5 % opthalmic solution  Multiple Vitamins-Minerals (MULTIVITAMIN WOMEN 50+ PO)  sertraline (ZOLOFT) 25 MG tablet    Past Medical History:    Patient Active Problem List   Diagnosis    Dry eyes    Umbilical hernia    Premenstrual tension syndrome    Myalgia    Diastasis of muscle    Capsular contracture of breast implant    Deficiency anemia   Diverticulosis      Past Surgical History:    Past Surgical History:   Procedure Laterality Date    APPENDECTOMY      breast impant  2001    COLONOSCOPY N/A 10/25/2022    Procedure: COLONOSCOPY;  Surgeon: Yusuf Martinez MD;  Location: [facility] REMOVAL OF BREAST IMPLANT  2019        Physical Exam   Patient Vitals for the past 24 hrs:   BP Temp Temp src Pulse Resp SpO2 Height Weight   04/12/24 2255 -- -- -- -- -- -- 1.6 m (5' 3\") 49.9 kg (110 lb)   04/12/24 2254 126/82 -- -- 62 -- 98 % -- --   04/12/24 2049 119/80 98.4  F (36.9  C) Temporal 64 16 97 % 1.6 m (5' 3\") --        Physical Exam    HEENT:    Oropharynx is moist  Eyes:    Conjunctiva normal  Neck:     Supple, no meningismus.   "   CV:     Regular rate and rhythm.      No murmurs, rubs or gallops.     No lower extremity edema.  PULM:    Clear to auscultation bilateral.       No respiratory distress.      Good air exchange.     No rales or wheezing.  ABD:    Soft, non-distended.       Mild diffuse lower abdominal tenderness.     No pulsatile masses.       No rebound, guarding or rigidity.     No CVA tenderness.      No hepatosplenomegaly.  MSK:     No gross deformity to all four extremities.   LYMPH:   No cervical lymphadenopathy.  NEURO:   Alert.  Good muscular tone, no atrophy.   Skin:    Warm, dry and intact.    Psych:    Mood is good and affect is appropriate.      Emergency Department Course   Imaging:  CT Abdomen Pelvis w Contrast   Preliminary Result   IMPRESSION:    1.  Focal area of prominent low-density wall thickening and surrounding inflammatory stranding and edema involving the sigmoid colon. Appearance is most likely due to an acute diverticulitis. No free air or abscess. Given the degree of wall thickening    and its focal nature, recommend follow-up evaluation following appropriate therapy to exclude any underlying lesion. This could be performed with colonoscopy if this has not been done recently.      2.  Large amount of stool in the colon. Nothing definite for bowel obstruction.      3.  However, there are multiple fluid-filled loops of small bowel which are normal in caliber. This could be seen with a nonspecific small bowel enteritis as a separate process. Clinical correlation and follow-up as clinically warranted.       Report per radiology.    Laboratory:  Labs Ordered and Resulted from Time of ED Arrival to Time of ED Departure   COMPREHENSIVE METABOLIC PANEL - Abnormal       Result Value    Sodium 136      Potassium 4.0      Carbon Dioxide (CO2) 24      Anion Gap 13      Urea Nitrogen 23.4 (*)     Creatinine 0.65      GFR Estimate >90      Calcium 9.2      Chloride 99      Glucose 101 (*)     Alkaline Phosphatase 59       AST 29      ALT 22      Protein Total 7.2      Albumin 4.4      Bilirubin Total 0.8     ROUTINE UA WITH MICROSCOPIC REFLEX TO CULTURE - Abnormal    Color Urine Straw      Appearance Urine Clear      Glucose Urine Negative      Bilirubin Urine Negative      Ketones Urine Negative      Specific Gravity Urine 1.008      Blood Urine Small (*)     pH Urine 5.0      Protein Albumin Urine Negative      Urobilinogen Urine Normal      Nitrite Urine Negative      Leukocyte Esterase Urine Negative      Bacteria Urine Few (*)     RBC Urine 2      WBC Urine <1      Squamous Epithelials Urine 1     CBC WITH PLATELETS AND DIFFERENTIAL - Abnormal    WBC Count 9.9      RBC Count 3.91      Hemoglobin 11.6 (*)     Hematocrit 35.1      MCV 90      MCH 29.7      MCHC 33.0      RDW 14.2      Platelet Count 157      % Neutrophils 79      % Lymphocytes 11      % Monocytes 9      % Eosinophils 1      % Basophils 0      % Immature Granulocytes 0      NRBCs per 100 WBC 0      Absolute Neutrophils 7.9      Absolute Lymphocytes 1.1      Absolute Monocytes 0.9      Absolute Eosinophils 0.1      Absolute Basophils 0.0      Absolute Immature Granulocytes 0.0      Absolute NRBCs 0.0     LIPASE - Normal    Lipase 40          Procedures   None    Emergency Department Course & Assessments:    Interventions:  Medications   sodium chloride 0.9% BOLUS 1,000 mL (0 mLs Intravenous Stopped 4/12/24 2245)   iopamidol (ISOVUE-370) solution 120 mL (54 mLs Intravenous $Given 4/12/24 2156)   Saline CT scan flush (54 mLs Intravenous $Given 4/12/24 2159)   amoxicillin-clavulanate (AUGMENTIN) 875-125 MG per tablet 1 tablet (1 tablet Oral $Given 4/12/24 2248)        Independent Interpretation (X-rays, CTs, rhythm strip):  None    Assessments/Consultations/Discussion of Management or Tests:  ED Course as of 04/12/24 2301 Fri Apr 12, 2024 2107 I obtained history and examined the patient as noted above.     2238 I re-evaluated and updated patient. I explained  findings to the patient and we discussed plan for discharge. The patient is comfortable with this plan.         Social Determinants of Health affecting care:   None    Disposition:  The patient was discharged.     Impression & Plan    CMS Diagnoses: None       Medical Decision Makin-year-old female presents with 2 days of diffuse lower abdominal pain.  Labs are unrevealing.  CT scan reveals sigmoid diverticulitis as a source of her symptoms.  She is without complication including perforation or abscess.  She was initiated on Augmentin and will be discharged home on the same.  The area of diverticulitis is somewhat focal and cannot rule out underlying mass/malignancy.  Patient made aware of this finding and the need for outpatient colonoscopy to rule out underlying malignancy but may also be necessary for surveillance of her diverticulosis.    Patient also reported constipation which was noted on CT scan.  Patient will discharge home on MiraLAX and Senokot.  Return to ED for worsening symptoms.    Diagnosis:    ICD-10-CM    1. Sigmoid diverticulitis  K57.32       2. Constipation, unspecified constipation type  K59.00          Discharge Medications:  Discharge Medication List as of 2024 10:51 PM        START taking these medications    Details   amoxicillin-clavulanate (AUGMENTIN) 875-125 MG tablet Take 1 tablet by mouth 3 times daily for 10 days, Disp-20 tablet, R-0, E-PrescribeTID dosing for diverticulitis      polyethylene glycol (MIRALAX) 17 GM/Dose powder 17 g p.o. 3 times daily until stool loose then 17 g p.o. daily.  Constipation, Disp-527 g, R-0, E-Prescribe      senna (SENOKOT) 8.6 MG tablet Take 2 tablets by mouth 2 times daily as needed for constipation, Disp-30 tablet, R-0, E-Prescribe            Scribe Disclosure:  Arthur MCKINLEY, am serving as a scribe at 9:08 PM on 2024 to document services personally performed by Ozzy oRssi MD, based on my observations and the  provider's statements to me.     4/12/2024   Ozzy Rossi MD Matthews, Jeremiah R, MD  04/12/24 5427

## 2024-04-13 NOTE — ED TRIAGE NOTES
"Pt went to  for for lower abd pain. Was recently in [place] for 6 days - got back 4/5. Sent here for imaging. Pt reports she feels as if she is constipated. Pt reports feeling better after \"farting and burping.\"    Pt refused to stand on scale in triage. Pt states \"I'm not standing on that. I'll just tell you that I'm 110lbs.\" Writer told pt we need accurate weight for ED course and pt states \"No, I'm not standing on there. I'm 110lbs.\"            "

## 2024-04-13 NOTE — DISCHARGE INSTRUCTIONS
Due to the findings on CT scan and diverticulitis, it is important that you have an outpatient colonoscopy when symptoms are improved to rule out an atypical mass in the colon.  This is also important for surveillance of your diverticulitis.    Discharge Instructions  Abdominal Pain    Abdominal pain (belly pain) can be caused by many things. Your evaluation today does not show the exact cause for your pain. Your provider today has decided that it is unlikely your pain is due to a life threatening problem, or a problem requiring surgery or hospital admission. Sometimes those problems cannot be found right away, so it is very important that you follow up as directed.  Sometimes only the changes which occur over time allow the cause of your pain to be found.    Generally, every Emergency Department visit should have a follow-up clinic visit with either a primary or a specialty clinic/provider. Please follow-up as instructed by your emergency provider today. With abdominal pain, we often recommend very close follow-up, such as the following day.    ADULTS:  Return to the Emergency Department right away if:    You get an oral temperature above 102oF or as directed by your provider.  You have blood in your stools. This may be bright red or appear as black, tarry stools.    You keep vomiting (throwing up) or cannot drink liquids.  You see blood when you vomit.   You cannot have a bowel movement or you cannot pass gas.  Your stomach gets bloated or bigger.  Your skin or the whites of your eyes look yellow.  You faint.  You have bloody, frequent or painful urination (peeing).  You have new symptoms or anything that worries you.    CHILDREN:  Return to the Emergency Department right away if your child has any of the above-listed symptoms or the following:    Pushes your hand away or screams/cries when his/her belly is touched.  You notice your child is very fussy or weak.  Your child is very tired and is too tired to eat or  drink.  Your child is dehydrated.  Signs of dehydration can be:  Significant change in the amount of wet diapers/urine.  Your infant or child starts to have dry mouth and lips, or no saliva (spit) or tears.    PREGNANT WOMEN:  Return to the Emergency Department right away if you have any of the above-listed symptoms or the following:    You have bleeding, leaking fluid or passing tissue from the vagina.  You have worse pain or cramping, or pain in your shoulder or back.  You have vomiting that will not stop.  You have a temperature of 100oF or more.  Your baby is not moving as much as usual.  You faint.  You get a bad headache with or without eye problems and abdominal pain.  You have a seizure.  You have unusual discharge from your vagina and abdominal pain.    Abdominal pain is pretty common during pregnancy.  Your pain may or may not be related to your pregnancy. You should follow-up closely with your OB provider so they can evaluate you and your baby.  Until you follow-up with your regular provider, do the following:     Avoid sex and do not put anything in your vagina.  Drink clear fluids.  Only take medications approved by your provider.    MORE INFORMATION:    Appendicitis:  A possible cause of abdominal pain in any person who still has their appendix is acute appendicitis. Appendicitis is often hard to diagnose.  Testing does not always rule out early appendicitis or other causes of abdominal pain. Close follow-up with your provider and re-evaluations may be needed to figure out the reason for your abdominal pain.    Follow-up:  It is very important that you make an appointment with your clinic and go to the appointment.  If you do not follow-up with your primary provider, it may result in missing an important development which could result in permanent injury or disability and/or lasting pain.  If there is any problem keeping your appointment, call your provider or return to the Emergency  "Department.    Medications:  Take your medications as directed by your provider today.  Before using over-the-counter medications, ask your provider and make sure to take the medications as directed.  If you have any questions about medications, ask your provider.    Diet:  Resume your normal diet as much as possible, but do not eat fried, fatty or spicy foods while you have pain.  Do not drink alcohol or have caffeine.  Do not smoke tobacco.    Probiotics: If you have been given an antibiotic, you may want to also take a probiotic pill or eat yogurt with live cultures. Probiotics have \"good bacteria\" to help your intestines stay healthy. Studies have shown that probiotics help prevent diarrhea (loose stools) and other intestine problems (including C. diff infection) when you take antibiotics. You can buy these without a prescription in the pharmacy section of the store.     If you were given a prescription for medicine here today, be sure to read all of the information (including the package insert) that comes with your prescription.  This will include important information about the medicine, its side effects, and any warnings that you need to know about.  The pharmacist who fills the prescription can provide more information and answer questions you may have about the medicine.  If you have questions or concerns that the pharmacist cannot address, please call or return to the Emergency Department.       Remember that you can always come back to the Emergency Department if you are not able to see your regular provider in the amount of time listed above, if you get any new symptoms, or if there is anything that worries you.    "

## 2024-04-13 NOTE — PROGRESS NOTES
THIS IS A TRIAGE ENCOUNTER.    HPI:  Bhakti Ibarra is a 59 year old female who presents with the CC of 3 days of gradual-onset worsening severe pain at the bilateral lower abdomen.  Pain is worse with walking, sitting down and applying pressure over the bilateral lower abdomen.  The pain is improved when standing up.      Pain is located in the bilateral lower quadrant area, with radiation (somewhat to the bilateral low back).  .  The pain is characterized as severe that comes and goes.  The pain interferes with sleep.   No vomiting.  No urinary frequency/urgency/dysuria.  Patient noticed a smaller than usual amount of stool yesterday (the stool was hard) and no bowel movements today.    Patient denies diarrhea/vomiting.      Patient also has noticed increased swelling at the bilateral lower abdomen.      Patient's October 25, 2022, Colonoscopy revealed some diverticulosis.      PAST MEDICAL HISTORY:  Dry eyes  Umbilical Hernia  Premenstrual Tension Syndrome  Myalgia  Diastasis of Muscle  Capsular Contracture of the Breast Implant  Anemia    S/p appendectomy when the patient was 16 or 17 years old.      Given the severity of the abdominal pain, patient will go to the [facility] emergency room for further evaluation.  I gave report to the emergency room nurse today at around 8:25 pm.  Patient will drive to the hospital by herself.     I told the patient that I would not charge for this triage evaluation.     Naif Baires MD

## 2024-04-24 ENCOUNTER — PATIENT OUTREACH (OUTPATIENT)
Dept: INTERNAL MEDICINE | Facility: CLINIC | Age: 59
End: 2024-04-24
Payer: COMMERCIAL

## 2024-04-24 NOTE — TELEPHONE ENCOUNTER
Patient Quality Outreach    Patient is due for the following:   Breast Cancer Screening - Mammogram  Cervical Cancer Screening - PAP Needed  Physical Preventive Adult Physical      Topic Date Due    Pneumococcal Vaccine (1 of 2 - PCV) Never done    Zoster (Shingles) Vaccine (1 of 2) Never done    Diptheria Tetanus Pertussis (DTAP/TDAP/TD) Vaccine (2 - Td or Tdap) 07/01/2021    Flu Vaccine (1) 09/01/2023    COVID-19 Vaccine (3 - 2023-24 season) 09/01/2023       Next Steps:   Schedule a Adult Preventative    Type of outreach:    Sent Mobile Card message.      Questions for provider review:    None           Елена Espinoza MA

## 2024-05-01 DIAGNOSIS — K57.32 DIVERTICULITIS OF COLON: Primary | ICD-10-CM

## 2024-06-25 ENCOUNTER — OFFICE VISIT (OUTPATIENT)
Dept: GASTROENTEROLOGY | Facility: CLINIC | Age: 59
End: 2024-06-25
Attending: INTERNAL MEDICINE
Payer: COMMERCIAL

## 2024-06-25 VITALS
HEIGHT: 63 IN | SYSTOLIC BLOOD PRESSURE: 113 MMHG | WEIGHT: 109 LBS | DIASTOLIC BLOOD PRESSURE: 74 MMHG | BODY MASS INDEX: 19.31 KG/M2 | HEART RATE: 56 BPM | OXYGEN SATURATION: 98 %

## 2024-06-25 DIAGNOSIS — K57.32 DIVERTICULITIS OF COLON: ICD-10-CM

## 2024-06-25 PROCEDURE — 99204 OFFICE O/P NEW MOD 45 MIN: CPT | Performed by: PHYSICIAN ASSISTANT

## 2024-06-25 ASSESSMENT — PAIN SCALES - GENERAL: PAINLEVEL: NO PAIN (0)

## 2024-06-25 NOTE — PROGRESS NOTES
NEW PATIENT GI CLINIC VISIT    CC/REFERRING MD:  Bharat Borja  REASON FOR CONSULTATION:   Bharat Borja for   Chief Complaint   Patient presents with    New Patient     New consult for diverticulitis.       ASSESSMENT/PLAN: Patient is here for evaluation of recurrent diverticulitis.  She has had 1 confirmed episode via CT this past April and another suspected episode in August 2022.  We spent some time discussing diverticular disease and risk of recurrence.  I agree with having her continue on Metamucil and she will titrate up to 1 tablespoon daily.  He will otherwise continue to eat a healthy diet and get regular exercise.  She is currently keeping things lower fiber, but she can try to advance dietary fiber as tolerated.  We did review that if she gets a recurrence, I instructed her to start a clear liquid diet for 12 to 24 hours.  If there is no improvement in symptoms, it would be reasonable to restart Augmentin.  At this time, I think referral to colorectal surgery to discuss elective hemicolectomy is not indicated at this time.  She can follow-up with me in 6 months, sooner as needed.    1. Diverticulitis of colon  - Adult GI  Referral - Consult Only  - amoxicillin-clavulanate (AUGMENTIN) 875-125 MG tablet; Take 1 tablet by mouth 2 times daily  Dispense: 14 tablet; Refill: 0        RTC 6 months    Thank you for this consultation.  It was a pleasure to participate in the care of this patient; please contact us with any further questions.      25 minutes spent on the date of the encounter doing chart review, patient visit, and documentation    This note was created with voice recognition software, and while reviewed for accuracy, typos may remain.     Dom Pruett PA-C  Division of Gastroenterology, Hepatology and Nutrition  [facility]  Bhakti Ibarra is a 59 year old female that is seen as a new patient in the GI clinic today for  recurrent diverticulitis.  To review, patient believes she had a first episode of diverticulitis roughly 2 years ago.  She did not have confirmatory CT scan, but diverticulitis was suspected.  She had subsequent colonoscopy in October 2022, which was notable for sigmoid diverticulosis, no polyps.  She then had a recurrence of the symptoms, lower abdominal pain and constipation, leading to ER visit this past April.  CT scan identified acute uncomplicated sigmoid diverticulitis.  She was treated with Augmentin and the symptoms improved.  She subsequently started taking Metamucil, which has incidentally helped some symptoms of proctalgia fugax.  She is having easily passed, formed stool, typically 3 days/week.  No hematochezia or melena.  She has switched to a lower fiber diet and is eating less sugar foods, as she is afraid of recurrence of diverticulitis.  She otherwise has been eating healthy, lots of fruits and vegetables, and exercising regularly with running.    Surgical history pertinent for appendectomy.  Family medical history notable for diverticulosis in mother.  No tobacco or drug use, social alcohol use.  ROS:    10 point ROS neg other than the symptoms noted above in the HPI.    PREVIOUS ENDOSCOPY:  Colonoscopy 1/25/2022  Impression:               - Diverticulosis in the sigmoid colon.                             - The examination was otherwise normal on direct                             and retroflexion views.                             - No specimens collected.   Recommendation:           - Use sugar-free Metamucil one teaspoon PO BID PRN.                             - Repeat colonoscopy in 10 years for screening                             purposes.     PERTINENT RELEVANT IMAGING OR LABS:  Admission on 04/12/2024, Discharged on 04/12/2024   Component Date Value Ref Range Status    Sodium 04/12/2024 136  135 - 145 mmol/L Final    Reference intervals for this test were updated on 09/26/2023 to more  accurately reflect our healthy population. There may be differences in the flagging of prior results with similar values performed with this method. Interpretation of those prior results can be made in the context of the updated reference intervals.     Potassium 04/12/2024 4.0  3.4 - 5.3 mmol/L Final    Carbon Dioxide (CO2) 04/12/2024 24  22 - 29 mmol/L Final    Anion Gap 04/12/2024 13  7 - 15 mmol/L Final    Urea Nitrogen 04/12/2024 23.4 (H)  8.0 - 23.0 mg/dL Final    Creatinine 04/12/2024 0.65  0.51 - 0.95 mg/dL Final    GFR Estimate 04/12/2024 >90  >60 mL/min/1.73m2 Final    Calcium 04/12/2024 9.2  8.8 - 10.2 mg/dL Final    Chloride 04/12/2024 99  98 - 107 mmol/L Final    Glucose 04/12/2024 101 (H)  70 - 99 mg/dL Final    Alkaline Phosphatase 04/12/2024 59  40 - 150 U/L Final    Reference intervals for this test were updated on 11/14/2023 to more accurately reflect our healthy population. There may be differences in the flagging of prior results with similar values performed with this method. Interpretation of those prior results can be made in the context of the updated reference intervals.    AST 04/12/2024 29  0 - 45 U/L Final    Reference intervals for this test were updated on 6/12/2023 to more accurately reflect our healthy population. There may be differences in the flagging of prior results with similar values performed with this method. Interpretation of those prior results can be made in the context of the updated reference intervals.    ALT 04/12/2024 22  0 - 50 U/L Final    Reference intervals for this test were updated on 6/12/2023 to more accurately reflect our healthy population. There may be differences in the flagging of prior results with similar values performed with this method. Interpretation of those prior results can be made in the context of the updated reference intervals.      Protein Total 04/12/2024 7.2  6.4 - 8.3 g/dL Final    Albumin 04/12/2024 4.4  3.5 - 5.2 g/dL Final    Bilirubin  Total 04/12/2024 0.8  <=1.2 mg/dL Final    Lipase 04/12/2024 40  13 - 60 U/L Final    Color Urine 04/12/2024 Straw  Colorless, Straw, Light Yellow, Yellow Final    Appearance Urine 04/12/2024 Clear  Clear Final    Glucose Urine 04/12/2024 Negative  Negative mg/dL Final    Bilirubin Urine 04/12/2024 Negative  Negative Final    Ketones Urine 04/12/2024 Negative  Negative mg/dL Final    Specific Gravity Urine 04/12/2024 1.008  1.003 - 1.035 Final    Blood Urine 04/12/2024 Small (A)  Negative Final    pH Urine 04/12/2024 5.0  5.0 - 7.0 Final    Protein Albumin Urine 04/12/2024 Negative  Negative mg/dL Final    Urobilinogen Urine 04/12/2024 Normal  Normal, 2.0 mg/dL Final    Nitrite Urine 04/12/2024 Negative  Negative Final    Leukocyte Esterase Urine 04/12/2024 Negative  Negative Final    Bacteria Urine 04/12/2024 Few (A)  None Seen /HPF Final    RBC Urine 04/12/2024 2  <=2 /HPF Final    WBC Urine 04/12/2024 <1  <=5 /HPF Final    Squamous Epithelials Urine 04/12/2024 1  <=1 /HPF Final    WBC Count 04/12/2024 9.9  4.0 - 11.0 10e3/uL Final    RBC Count 04/12/2024 3.91  3.80 - 5.20 10e6/uL Final    Hemoglobin 04/12/2024 11.6 (L)  11.7 - 15.7 g/dL Final    Hematocrit 04/12/2024 35.1  35.0 - 47.0 % Final    MCV 04/12/2024 90  78 - 100 fL Final    MCH 04/12/2024 29.7  26.5 - 33.0 pg Final    MCHC 04/12/2024 33.0  31.5 - 36.5 g/dL Final    RDW 04/12/2024 14.2  10.0 - 15.0 % Final    Platelet Count 04/12/2024 157  150 - 450 10e3/uL Final    % Neutrophils 04/12/2024 79  % Final    % Lymphocytes 04/12/2024 11  % Final    % Monocytes 04/12/2024 9  % Final    % Eosinophils 04/12/2024 1  % Final    % Basophils 04/12/2024 0  % Final    % Immature Granulocytes 04/12/2024 0  % Final    NRBCs per 100 WBC 04/12/2024 0  <1 /100 Final    Absolute Neutrophils 04/12/2024 7.9  1.6 - 8.3 10e3/uL Final    Absolute Lymphocytes 04/12/2024 1.1  0.8 - 5.3 10e3/uL Final    Absolute Monocytes 04/12/2024 0.9  0.0 - 1.3 10e3/uL Final    Absolute  Eosinophils 04/12/2024 0.1  0.0 - 0.7 10e3/uL Final    Absolute Basophils 04/12/2024 0.0  0.0 - 0.2 10e3/uL Final    Absolute Immature Granulocytes 04/12/2024 0.0  <=0.4 10e3/uL Final    Absolute NRBCs 04/12/2024 0.0  10e3/uL Final    Hold Specimen 04/12/2024 [facility]   Final    Hold Specimen 04/12/2024 [facility]   Final         ALLERGIES:   No Known Allergies    PERTINENT MEDICATIONS:    Current Outpatient Medications:     amoxicillin-clavulanate (AUGMENTIN) 875-125 MG tablet, Take 1 tablet by mouth 2 times daily, Disp: 14 tablet, Rfl: 0    amoxicillin-clavulanate (AUGMENTIN) 875-125 MG tablet, Take 1 tablet by mouth 3 times daily, Disp: 10 tablet, Rfl: 0    fish oil-omega-3 fatty acids 500 MG capsule, , Disp: , Rfl:     lactobacillus rhamnosus, GG, (CULTURELL) capsule, Take 1 capsule by mouth 2 times daily Called Seed, pre probiotic, Disp: , Rfl:     lifitegrast (XIIDRA) 5 % opthalmic solution, Place 1 drop into both eyes 2 times daily Instill 1 drop in both eyes twice daily  for 90 days., Disp: , Rfl:     Multiple Vitamins-Minerals (MULTIVITAMIN WOMEN 50+ PO), , Disp: , Rfl:     polyethylene glycol (MIRALAX) 17 GM/Dose powder, 17 g p.o. 3 times daily until stool loose then 17 g p.o. daily.  Constipation, Disp: 527 g, Rfl: 0    sertraline (ZOLOFT) 25 MG tablet, Take 25 mg by mouth, Disp: , Rfl:     UNABLE TO FIND, Curcumin (Patient not taking: Reported on 4/12/2024), Disp: , Rfl:     PROBLEM LIST  Patient Active Problem List    Diagnosis Date Noted    Dry eyes 01/05/2021     Priority: Medium    Capsular contracture of breast implant 05/23/2018     Priority: Medium     Formatting of this note might be different from the original.  Chronic Obstructive Pulmonary Disease; Chronic airway obstruction, not elsewhere classified      Myalgia 04/20/2012     Priority: Medium     Last Assessment & Plan:   Formatting of this note might be different from the original.  Patient presents to the clinic today stating that she has been sick  since Sunday.  She has had body aches and chills.  She is somebody that runs every day and has been unable to run for 3 days.  She's had fevers initially they are a bit better now.  At night she wakes up and she soaks her she.  No runny nose or cough the been noted.  She does have a sore throat and body aches.  No vomiting or diarrhea.  No urgency burning or frequency with urination.      Umbilical hernia 05/13/2005     Priority: Medium     Formatting of this note might be different from the original.  LW Onset:  1999  ; Hernia Umbilical      Premenstrual tension syndrome 05/13/2005     Priority: Medium     Formatting of this note might be different from the original.  LW Onset:  2001  ; Premenstrual Syndrome      Diastasis of muscle 05/13/2005     Priority: Medium     Formatting of this note might be different from the original.  LW Onset:  1999  ; Diastasis Recti      Deficiency anemia 05/13/2005     Priority: Medium     Formatting of this note might be different from the original.  Anemia Chronic         PERTINENT PAST MEDICAL HISTORY:  Past Medical History:   Diagnosis Date    No known health problems        PREVIOUS SURGERIES:  Past Surgical History:   Procedure Laterality Date    APPENDECTOMY      breast impant  2001    COLONOSCOPY N/A 10/25/2022    Procedure: COLONOSCOPY;  Surgeon: Yusuf Martinez MD;  Location:  GI    HC REMOVAL OF BREAST IMPLANT  2019       SOCIAL HISTORY:  Social History     Socioeconomic History    Marital status:      Spouse name: Not on file    Number of children: Not on file    Years of education: Not on file    Highest education level: Not on file   Occupational History    Not on file   Tobacco Use    Smoking status: Never    Smokeless tobacco: Never   Vaping Use    Vaping status: Never Used   Substance and Sexual Activity    Alcohol use: Yes     Comment: Social    Drug use: No    Sexual activity: Not on file   Other Topics Concern    Parent/sibling w/ CABG, MI or  "angioplasty before 65F 55M? Not Asked   Social History Narrative    Not on file     Social Determinants of Health     Financial Resource Strain: Not on file   Food Insecurity: Not on file   Transportation Needs: Not on file   Physical Activity: Not on file   Stress: Not on file   Social Connections: Not on file   Interpersonal Safety: Not on file   Housing Stability: Not on file       FAMILY HISTORY:  Family History   Problem Relation Age of Onset    Breast Cancer Mother        Past/family/social history reviewed and no changes    PHYSICAL EXAMINATION:  Constitutional: aaox3, cooperative, pleasant, not dyspneic/diaphoretic, no acute distress  Vitals reviewed: /74 (BP Location: Right arm, Patient Position: Sitting, Cuff Size: Adult Regular)   Pulse 56   Ht 1.6 m (5' 3\")   Wt 49.4 kg (109 lb)   SpO2 98%   BMI 19.31 kg/m    Wt:   Wt Readings from Last 2 Encounters:   06/25/24 49.4 kg (109 lb)   04/12/24 49.9 kg (110 lb)      Eyes: Sclera anicteric/injected  CV: No edema  Respiratory: Unlabored breathing  Skin: warm, perfused, no jaundice  Psych: Normal affect  MSK: Normal gait                    "

## 2024-06-25 NOTE — NURSING NOTE
"Chief Complaint   Patient presents with    New Patient     New consult for diverticulitis.     She requests these members of her care team be copied on today's visit information:  PCP:   Jeffery L Cunningham,  [place] [address]    Vitals:    06/25/24 1456   BP: 113/74   BP Location: Right arm   Patient Position: Sitting   Cuff Size: Adult Regular   Pulse: 56   SpO2: 98%   Weight: 49.4 kg (109 lb)   Height: 1.6 m (5' 3\")     Body mass index is 19.31 kg/m .    Medications were reconciled.        Anya Abad CMA    "

## 2024-06-25 NOTE — PATIENT INSTRUCTIONS
Please see below for any additional questions and scheduling guidelines.    Sign up for [employer]): [employer] patient portal serves as a secure platform for accessing your medical records from the [facility]. Additionally, [employer] facilitates easy, timely, and secure messaging with your care team. If you have not signed up, you may do so by using the provided code or calling [phone].    Coordinating your care after your visit:  There are multiple options for scheduling your follow-up care based on your provider's recommendation.    How do I schedule a follow-up clinic appointment:   After your appointment, you may receive scheduling assistance with the Clinic Coordinators by having a seat in the waiting room and a Clinic Coordinator will call you up to schedule.  Virtual visits or after you leave the clinic:  Your provider has placed a follow-up order in the [employer] portal for scheduling your return appointment. A member of the scheduling team will contact you to schedule.  [employer] Scheduling: Timely scheduling through [employer] is advised to ensure appointment availability.   Call to schedule: You may schedule your follow-up appointment(s) by calling [phone], option 1.    How do I schedule my endoscopy or colonoscopy procedure:  If a procedure, such as a colonoscopy or upper endoscopy was ordered by your provider, the scheduling team will contact you to schedule this procedure. Or you may choose to call to schedule at   [phone], option 2.  Please allow 20-30 minutes when scheduling a procedure.    How do I get my blood work done? To get your blood work done, you need to schedule a lab appointment at an [facility]. There are multiple ways to schedule:   At the clinic: The Clinic Coordinator you meet after your visit can help you schedule a lab appointment.   [employer] scheduling: [employer] offers online lab scheduling at all [facility] laboratory locations.   Call to  schedule: You can call [phone] to schedule your lab appointment.    How do I schedule my imaging study: To schedule imaging studies, such as CT scans, ultrasounds, MRIs, or X-rays, contact Imaging Services at [phone].    How do I schedule a referral to another doctor: If your provider recommended a referral to another specialist(s), the referral order was placed by your provider. You will receive a phone call to schedule this referral, or you may choose to call the number attached to the referral to self-schedule.    For Post-Visit Question(s):  For any inquiries following today's visit:  Please utilize [employer] messaging and allow 48 hours for reply or contact the Call Center during normal business hours at [phone], option 3.  For Emergent After-hours questions, contact the On-Call GI Fellow through the [facility]  at [phone].  In addition, you may contact your Nurse directly using the provided contact information.    Test Results:  Test results will be accessible via [employer] in compliance with the 21st Century Cures Act. This means that your results will be available to you at the same time as your provider. Often you may see your results before your provider does. Results are reviewed by staff within two weeks with communication follow-up. Results may be released in the patient portal prior to your care team review.    Prescription Refill(s):  Medication prescribed by your provider will be addressed during your visit. For future refills, please coordinate with your pharmacy. If you have not had a recent clinic visit or routine labs, for your safety, your provider may not be able to refill your prescription.

## 2024-06-25 NOTE — LETTER
6/25/2024      Bhakti Ibarra  [address]  [facility] [place] [address]      Dear Colleague,    Thank you for referring your patient, Bhakti Ibarra, to the [facility]. Please see a copy of my visit note below.    NEW PATIENT GI CLINIC VISIT    CC/REFERRING MD:  Bharat Borja  REASON FOR CONSULTATION:   Bharat Borja for   Chief Complaint   Patient presents with     New Patient     New consult for diverticulitis.       ASSESSMENT/PLAN: Patient is here for evaluation of recurrent diverticulitis.  She has had 1 confirmed episode via CT this past April and another suspected episode in August 2022.  We spent some time discussing diverticular disease and risk of recurrence.  I agree with having her continue on Metamucil and she will titrate up to 1 tablespoon daily.  He will otherwise continue to eat a healthy diet and get regular exercise.  She is currently keeping things lower fiber, but she can try to advance dietary fiber as tolerated.  We did review that if she gets a recurrence, I instructed her to start a clear liquid diet for 12 to 24 hours.  If there is no improvement in symptoms, it would be reasonable to restart Augmentin.  At this time, I think referral to colorectal surgery to discuss elective hemicolectomy is not indicated at this time.  She can follow-up with me in 6 months, sooner as needed.    1. Diverticulitis of colon  - Adult GI  Referral - Consult Only  - amoxicillin-clavulanate (AUGMENTIN) 875-125 MG tablet; Take 1 tablet by mouth 2 times daily  Dispense: 14 tablet; Refill: 0        RTC 6 months    Thank you for this consultation.  It was a pleasure to participate in the care of this patient; please contact us with any further questions.      25 minutes spent on the date of the encounter doing chart review, patient visit, and documentation    This note was created with voice recognition software, and while reviewed for accuracy, typos  may remain.     Dom Pruett PA-C  Division of Gastroenterology, Hepatology and Nutrition  [facility]  Bhakti Ibarra is a 59 year old female that is seen as a new patient in the GI clinic today for recurrent diverticulitis.  To review, patient believes she had a first episode of diverticulitis roughly 2 years ago.  She did not have confirmatory CT scan, but diverticulitis was suspected.  She had subsequent colonoscopy in October 2022, which was notable for sigmoid diverticulosis, no polyps.  She then had a recurrence of the symptoms, lower abdominal pain and constipation, leading to ER visit this past April.  CT scan identified acute uncomplicated sigmoid diverticulitis.  She was treated with Augmentin and the symptoms improved.  She subsequently started taking Metamucil, which has incidentally helped some symptoms of proctalgia fugax.  She is having easily passed, formed stool, typically 3 days/week.  No hematochezia or melena.  She has switched to a lower fiber diet and is eating less sugar foods, as she is afraid of recurrence of diverticulitis.  She otherwise has been eating healthy, lots of fruits and vegetables, and exercising regularly with running.    Surgical history pertinent for appendectomy.  Family medical history notable for diverticulosis in mother.  No tobacco or drug use, social alcohol use.  ROS:    10 point ROS neg other than the symptoms noted above in the HPI.    PREVIOUS ENDOSCOPY:  Colonoscopy 1/25/2022  Impression:               - Diverticulosis in the sigmoid colon.                             - The examination was otherwise normal on direct                             and retroflexion views.                             - No specimens collected.   Recommendation:           - Use sugar-free Metamucil one teaspoon PO BID PRN.                             - Repeat colonoscopy in 10 years for screening                             purposes.      PERTINENT RELEVANT IMAGING OR LABS:  Admission on 04/12/2024, Discharged on 04/12/2024   Component Date Value Ref Range Status     Sodium 04/12/2024 136  135 - 145 mmol/L Final    Reference intervals for this test were updated on 09/26/2023 to more accurately reflect our healthy population. There may be differences in the flagging of prior results with similar values performed with this method. Interpretation of those prior results can be made in the context of the updated reference intervals.      Potassium 04/12/2024 4.0  3.4 - 5.3 mmol/L Final     Carbon Dioxide (CO2) 04/12/2024 24  22 - 29 mmol/L Final     Anion Gap 04/12/2024 13  7 - 15 mmol/L Final     Urea Nitrogen 04/12/2024 23.4 (H)  8.0 - 23.0 mg/dL Final     Creatinine 04/12/2024 0.65  0.51 - 0.95 mg/dL Final     GFR Estimate 04/12/2024 >90  >60 mL/min/1.73m2 Final     Calcium 04/12/2024 9.2  8.8 - 10.2 mg/dL Final     Chloride 04/12/2024 99  98 - 107 mmol/L Final     Glucose 04/12/2024 101 (H)  70 - 99 mg/dL Final     Alkaline Phosphatase 04/12/2024 59  40 - 150 U/L Final    Reference intervals for this test were updated on 11/14/2023 to more accurately reflect our healthy population. There may be differences in the flagging of prior results with similar values performed with this method. Interpretation of those prior results can be made in the context of the updated reference intervals.     AST 04/12/2024 29  0 - 45 U/L Final    Reference intervals for this test were updated on 6/12/2023 to more accurately reflect our healthy population. There may be differences in the flagging of prior results with similar values performed with this method. Interpretation of those prior results can be made in the context of the updated reference intervals.     ALT 04/12/2024 22  0 - 50 U/L Final    Reference intervals for this test were updated on 6/12/2023 to more accurately reflect our healthy population. There may be differences in the flagging of prior results  with similar values performed with this method. Interpretation of those prior results can be made in the context of the updated reference intervals.       Protein Total 04/12/2024 7.2  6.4 - 8.3 g/dL Final     Albumin 04/12/2024 4.4  3.5 - 5.2 g/dL Final     Bilirubin Total 04/12/2024 0.8  <=1.2 mg/dL Final     Lipase 04/12/2024 40  13 - 60 U/L Final     Color Urine 04/12/2024 Straw  Colorless, Straw, Light Yellow, Yellow Final     Appearance Urine 04/12/2024 Clear  Clear Final     Glucose Urine 04/12/2024 Negative  Negative mg/dL Final     Bilirubin Urine 04/12/2024 Negative  Negative Final     Ketones Urine 04/12/2024 Negative  Negative mg/dL Final     Specific Gravity Urine 04/12/2024 1.008  1.003 - 1.035 Final     Blood Urine 04/12/2024 Small (A)  Negative Final     pH Urine 04/12/2024 5.0  5.0 - 7.0 Final     Protein Albumin Urine 04/12/2024 Negative  Negative mg/dL Final     Urobilinogen Urine 04/12/2024 Normal  Normal, 2.0 mg/dL Final     Nitrite Urine 04/12/2024 Negative  Negative Final     Leukocyte Esterase Urine 04/12/2024 Negative  Negative Final     Bacteria Urine 04/12/2024 Few (A)  None Seen /HPF Final     RBC Urine 04/12/2024 2  <=2 /HPF Final     WBC Urine 04/12/2024 <1  <=5 /HPF Final     Squamous Epithelials Urine 04/12/2024 1  <=1 /HPF Final     WBC Count 04/12/2024 9.9  4.0 - 11.0 10e3/uL Final     RBC Count 04/12/2024 3.91  3.80 - 5.20 10e6/uL Final     Hemoglobin 04/12/2024 11.6 (L)  11.7 - 15.7 g/dL Final     Hematocrit 04/12/2024 35.1  35.0 - 47.0 % Final     MCV 04/12/2024 90  78 - 100 fL Final     MCH 04/12/2024 29.7  26.5 - 33.0 pg Final     MCHC 04/12/2024 33.0  31.5 - 36.5 g/dL Final     RDW 04/12/2024 14.2  10.0 - 15.0 % Final     Platelet Count 04/12/2024 157  150 - 450 10e3/uL Final     % Neutrophils 04/12/2024 79  % Final     % Lymphocytes 04/12/2024 11  % Final     % Monocytes 04/12/2024 9  % Final     % Eosinophils 04/12/2024 1  % Final     % Basophils 04/12/2024 0  % Final     %  Immature Granulocytes 04/12/2024 0  % Final     NRBCs per 100 WBC 04/12/2024 0  <1 /100 Final     Absolute Neutrophils 04/12/2024 7.9  1.6 - 8.3 10e3/uL Final     Absolute Lymphocytes 04/12/2024 1.1  0.8 - 5.3 10e3/uL Final     Absolute Monocytes 04/12/2024 0.9  0.0 - 1.3 10e3/uL Final     Absolute Eosinophils 04/12/2024 0.1  0.0 - 0.7 10e3/uL Final     Absolute Basophils 04/12/2024 0.0  0.0 - 0.2 10e3/uL Final     Absolute Immature Granulocytes 04/12/2024 0.0  <=0.4 10e3/uL Final     Absolute NRBCs 04/12/2024 0.0  10e3/uL Final     Hold Specimen 04/12/2024 [facility]   Final     Hold Specimen 04/12/2024 [facility]   Final         ALLERGIES:   No Known Allergies    PERTINENT MEDICATIONS:    Current Outpatient Medications:      amoxicillin-clavulanate (AUGMENTIN) 875-125 MG tablet, Take 1 tablet by mouth 2 times daily, Disp: 14 tablet, Rfl: 0     amoxicillin-clavulanate (AUGMENTIN) 875-125 MG tablet, Take 1 tablet by mouth 3 times daily, Disp: 10 tablet, Rfl: 0     fish oil-omega-3 fatty acids 500 MG capsule, , Disp: , Rfl:      lactobacillus rhamnosus, GG, (CULTURELL) capsule, Take 1 capsule by mouth 2 times daily Called Seed, pre probiotic, Disp: , Rfl:      lifitegrast (XIIDRA) 5 % opthalmic solution, Place 1 drop into both eyes 2 times daily Instill 1 drop in both eyes twice daily  for 90 days., Disp: , Rfl:      Multiple Vitamins-Minerals (MULTIVITAMIN WOMEN 50+ PO), , Disp: , Rfl:      polyethylene glycol (MIRALAX) 17 GM/Dose powder, 17 g p.o. 3 times daily until stool loose then 17 g p.o. daily.  Constipation, Disp: 527 g, Rfl: 0     sertraline (ZOLOFT) 25 MG tablet, Take 25 mg by mouth, Disp: , Rfl:      UNABLE TO FIND, Curcumin (Patient not taking: Reported on 4/12/2024), Disp: , Rfl:     PROBLEM LIST  Patient Active Problem List    Diagnosis Date Noted     Dry eyes 01/05/2021     Priority: Medium     Capsular contracture of breast implant 05/23/2018     Priority: Medium     Formatting of this note might be different from  the original.  Chronic Obstructive Pulmonary Disease; Chronic airway obstruction, not elsewhere classified       Myalgia 04/20/2012     Priority: Medium     Last Assessment & Plan:   Formatting of this note might be different from the original.  Patient presents to the clinic today stating that she has been sick since Sunday.  She has had body aches and chills.  She is somebody that runs every day and has been unable to run for 3 days.  She's had fevers initially they are a bit better now.  At night she wakes up and she soaks her she.  No runny nose or cough the been noted.  She does have a sore throat and body aches.  No vomiting or diarrhea.  No urgency burning or frequency with urination.       Umbilical hernia 05/13/2005     Priority: Medium     Formatting of this note might be different from the original.  LW Onset:  1999  ; Hernia Umbilical       Premenstrual tension syndrome 05/13/2005     Priority: Medium     Formatting of this note might be different from the original.  LW Onset:  2001  ; Premenstrual Syndrome       Diastasis of muscle 05/13/2005     Priority: Medium     Formatting of this note might be different from the original.  LW Onset:  1999  ; Diastasis Recti       Deficiency anemia 05/13/2005     Priority: Medium     Formatting of this note might be different from the original.  Anemia Chronic         PERTINENT PAST MEDICAL HISTORY:  Past Medical History:   Diagnosis Date     No known health problems        PREVIOUS SURGERIES:  Past Surgical History:   Procedure Laterality Date     APPENDECTOMY       breast impant  2001     COLONOSCOPY N/A 10/25/2022    Procedure: COLONOSCOPY;  Surgeon: Yusuf Martinez MD;  Location: [facility] REMOVAL OF BREAST IMPLANT  2019       SOCIAL HISTORY:  Social History     Socioeconomic History     Marital status:      Spouse name: Not on file     Number of children: Not on file     Years of education: Not on file     Highest education level: Not on file  "  Occupational History     Not on file   Tobacco Use     Smoking status: Never     Smokeless tobacco: Never   Vaping Use     Vaping status: Never Used   Substance and Sexual Activity     Alcohol use: Yes     Comment: Social     Drug use: No     Sexual activity: Not on file   Other Topics Concern     Parent/sibling w/ CABG, MI or angioplasty before 65F 55M? Not Asked   Social History Narrative     Not on file     Social Determinants of Health     Financial Resource Strain: Not on file   Food Insecurity: Not on file   Transportation Needs: Not on file   Physical Activity: Not on file   Stress: Not on file   Social Connections: Not on file   Interpersonal Safety: Not on file   Housing Stability: Not on file       FAMILY HISTORY:  Family History   Problem Relation Age of Onset     Breast Cancer Mother        Past/family/social history reviewed and no changes    PHYSICAL EXAMINATION:  Constitutional: aaox3, cooperative, pleasant, not dyspneic/diaphoretic, no acute distress  Vitals reviewed: /74 (BP Location: Right arm, Patient Position: Sitting, Cuff Size: Adult Regular)   Pulse 56   Ht 1.6 m (5' 3\")   Wt 49.4 kg (109 lb)   SpO2 98%   BMI 19.31 kg/m    Wt:   Wt Readings from Last 2 Encounters:   06/25/24 49.4 kg (109 lb)   04/12/24 49.9 kg (110 lb)      Eyes: Sclera anicteric/injected  CV: No edema  Respiratory: Unlabored breathing  Skin: warm, perfused, no jaundice  Psych: Normal affect  MSK: Normal gait                      Again, thank you for allowing me to participate in the care of your patient.        Sincerely,        Dom Pruett PA-C  "

## 2024-10-19 ENCOUNTER — HEALTH MAINTENANCE LETTER (OUTPATIENT)
Age: 59
End: 2024-10-19

## 2024-12-17 ENCOUNTER — VIRTUAL VISIT (OUTPATIENT)
Dept: GASTROENTEROLOGY | Facility: CLINIC | Age: 59
End: 2024-12-17
Attending: PHYSICIAN ASSISTANT
Payer: COMMERCIAL

## 2024-12-17 VITALS — WEIGHT: 110 LBS | HEIGHT: 63 IN | BODY MASS INDEX: 19.49 KG/M2

## 2024-12-17 DIAGNOSIS — K57.32 DIVERTICULITIS OF COLON: ICD-10-CM

## 2024-12-17 PROCEDURE — 99214 OFFICE O/P EST MOD 30 MIN: CPT | Mod: 95 | Performed by: PHYSICIAN ASSISTANT

## 2024-12-17 ASSESSMENT — PAIN SCALES - GENERAL: PAINLEVEL_OUTOF10: NO PAIN (0)

## 2024-12-17 NOTE — PROGRESS NOTES
GASTROENTEROLOGY Follow-up VIDEO VISIT    CC/REFERRING MD:    Bharat Borja    REASON FOR CONSULTATION:   Dom Pruett for   Chief Complaint   Patient presents with    RECHECK     F/U       HISTORY OF PRESENT ILLNESS:    Bhakti Ibarra is a 59 year old female who is being evaluated via a billable video visit for follow-up.  I initially met with patient about 6 months ago.  At that time, she had what we felt was likely 2 episodes of diverticulitis, the first occurring in 2022 which was not confirmed with CT scan.  The more recent episode was in April of this year, seen in the ER and was confirmed to have acute uncomplicated sigmoid diverticulitis.  She improved with Augmentin pretty quickly.  After that, she started with fiber supplement and was describing pretty good stool output.  We noted that her colonoscopy from 2022 noted sigmoid diverticulosis but no polyps, so I did not feel that she needed another colonoscopy after the second episode.  I had provided her with a prescription of Augmentin to use as needed with her recurrent infection.    She follows up today stating that after her visit with me, she did quite well for several months, was consistent with the fiber use and staying active, avoiding nuts and seeds and seemingly doing well with stool output.  She then travel to [place] in November and upon returning, had another episode of suspected diverticulitis, with classic symptoms of left lower quadrant abdominal pain.  She did take a course of Augmentin and got better within a few days.  She is worried that she might get another infection and would like a refill of the Augmentin.  She remains committed to maintaining dietary change and fiber supplement to use to avoid needing any type of surgery for this.  She does have a trip to [facility] scheduled for February, concerned about going and looking for advice.    PREVIOUS ENDOSCOPY:  Colonoscopy 1/25/2022  Impression:               -  Diverticulosis in the sigmoid colon.                             - The examination was otherwise normal on direct                             and retroflexion views.                             - No specimens collected.   Recommendation:           - Use sugar-free Metamucil one teaspoon PO BID PRN.                             - Repeat colonoscopy in 10 years for screening                             purposes.       I have reviewed and updated the patient's Past Medical History, Social History, Family History and Medication List.    Exam:    General appearance:  Healthy appearing adult, in no acute distress  Eyes:  Sclera anicteric, Pupils round and reactive to light  Ears, nose, mouth and throat:  No obvious external lesions of ears and nose.  Hearing intact  Neck:  Symmetric, No obvious external lesions  Respiratory:  Normal respiration, no use of accessory muscles   MSK:  No visual upper extremity, neck or facial muscle atrophy  ABD:  No visual abdominal distention, no audible borborygmi  Skin:  No rashes or jaundice   Psychiatric:  Oriented to person, place and time, Appropriate mood and affect.   Neurologic:  Peripheral muscle function and dexterity appear to be intact      PERTINENT STUDIES have been reviewed.    ASSESSMENT/PLAN:    Bhakti Ibarra is a 59 year old female who presents for follow up of recurrent sigmoid diverticulitis.  Patient has now had 3 suspected episodes, 1 of which was confirmed via CT scan.  It seems like the trigger for this most recent episode was travel, which likely carried associated dehydration and constipation.  Reviewed that the risk of complicated diverticulitis goes down with each episode of uncomplicated diverticulitis, and I agree that persisting with conservative treatments here for recurrent infections would be reasonable.  Augmentin was refilled for the patient.  Reviewed that if she were to travel again, advised being aggressive with hydration and avoidance of  constipation.  Okay to follow-up with me in 1 year, sooner as needed.    1. Diverticulitis of colon  - amoxicillin-clavulanate (AUGMENTIN) 875-125 MG tablet; Take 1 tablet by mouth 2 times daily.  Dispense: 14 tablet; Refill: 3      Video-Visit Details    Video Visit Time: 19 minutes    Type of service:  Video Visit    Originating Location (pt. Location): Home    Distant Location (provider location):  Off-site    Platform used for Video Visit: PressBaby    A total of 25 minutes was spent with reviewing the chart, discussing with the patient, documentation and coordination of care on 12/17/2024.    Dom Pruett PA-C  Division of Gastroenterology, Hepatology, and Nutrition  [facility]  [phone]    RT 1 year

## 2024-12-17 NOTE — NURSING NOTE
Current patient location: [address] [place] [id number]    Is the patient currently in the state of [place]? YES    Visit mode:VIDEO    If the visit is dropped, the patient can be reconnected by:VIDEO VISIT: Text to cell phone:   Telephone Information:   Mobile [phone]       Will anyone else be joining the visit? NO  (If patient encounters technical issues they should call [phone] :[phone])    Are changes needed to the allergy or medication list? No    Are refills needed on medications prescribed by this physician? NO    Rooming Documentation:  Questionnaire(s) not pre-assigned    Reason for visit: RECHECK (F/U)    Angela MORALES

## 2025-01-16 ENCOUNTER — OFFICE VISIT (OUTPATIENT)
Dept: INTERNAL MEDICINE | Facility: CLINIC | Age: 60
End: 2025-01-16
Payer: COMMERCIAL

## 2025-01-16 VITALS
DIASTOLIC BLOOD PRESSURE: 63 MMHG | RESPIRATION RATE: 16 BRPM | WEIGHT: 107.8 LBS | TEMPERATURE: 97.8 F | SYSTOLIC BLOOD PRESSURE: 96 MMHG | HEART RATE: 76 BPM | HEIGHT: 63 IN | OXYGEN SATURATION: 98 % | BODY MASS INDEX: 19.1 KG/M2

## 2025-01-16 DIAGNOSIS — M21.611 BUNION, RIGHT: ICD-10-CM

## 2025-01-16 DIAGNOSIS — Z01.818 PREOPERATIVE EXAMINATION: Primary | ICD-10-CM

## 2025-01-16 LAB
BASOPHILS # BLD AUTO: 0 10E3/UL (ref 0–0.2)
BASOPHILS NFR BLD AUTO: 0 %
EOSINOPHIL # BLD AUTO: 0.1 10E3/UL (ref 0–0.7)
EOSINOPHIL NFR BLD AUTO: 1 %
ERYTHROCYTE [DISTWIDTH] IN BLOOD BY AUTOMATED COUNT: 13.5 % (ref 10–15)
HCT VFR BLD AUTO: 38.2 % (ref 35–47)
HGB BLD-MCNC: 13 G/DL (ref 11.7–15.7)
IMM GRANULOCYTES # BLD: 0 10E3/UL
IMM GRANULOCYTES NFR BLD: 0 %
LYMPHOCYTES # BLD AUTO: 1.6 10E3/UL (ref 0.8–5.3)
LYMPHOCYTES NFR BLD AUTO: 22 %
MCH RBC QN AUTO: 29.5 PG (ref 26.5–33)
MCHC RBC AUTO-ENTMCNC: 34 G/DL (ref 31.5–36.5)
MCV RBC AUTO: 87 FL (ref 78–100)
MONOCYTES # BLD AUTO: 0.5 10E3/UL (ref 0–1.3)
MONOCYTES NFR BLD AUTO: 8 %
NEUTROPHILS # BLD AUTO: 4.9 10E3/UL (ref 1.6–8.3)
NEUTROPHILS NFR BLD AUTO: 69 %
PLATELET # BLD AUTO: 190 10E3/UL (ref 150–450)
RBC # BLD AUTO: 4.4 10E6/UL (ref 3.8–5.2)
WBC # BLD AUTO: 7.1 10E3/UL (ref 4–11)

## 2025-01-16 NOTE — PROGRESS NOTES
Preoperative Evaluation  [place] [address]  [address]  [facility] [address]  Phone: [phone]  Primary Provider: Bharat Borja MD  Pre-op Performing Provider: Bharat Borja MD  Jan 16, 2025   {Provider  Link to PREOP SmartSet  REQUIRED to apply standard patient instructions and medication directions to the AVS :217076}  {ROOMER review and update patient entered surgical information if needed :999284}  { After Pre-op is completed, use lists to pull documentation into note Link to complete Pre-Op    :558688}  {Pull Surgical Information into note after flowsheet completed:340262}  Fax number for surgical facility: {:617193}    {Provider Charting Preference for Preop :987926}    Trisha Ya is a 59 year old, presenting for the following:  No chief complaint on file.      {(!) Visit Details have not yet been documented.  Please enter Visit Details and then use this list to pull in documentation. (Optional):136040}  HPI related to upcoming procedure: ***  {Pull Pre-Op Questionnaire into note after flowsheet completed:729431}  Health Care Directive  Patient does not have a Health Care Directive: {ADVANCE_DIRECTIVE_STATUS:085141}    Preoperative Review of   {Mnpmpreport:624112}  {Review MNPMP for all patients per ICSI MNPMP Profile:907007}    {Chronic problem details (Optional) :307601}    Patient Active Problem List    Diagnosis Date Noted    Dry eyes 01/05/2021     Priority: Medium    Capsular contracture of breast implant 05/23/2018     Priority: Medium     Formatting of this note might be different from the original.  Chronic Obstructive Pulmonary Disease; Chronic airway obstruction, not elsewhere classified      Myalgia 04/20/2012     Priority: Medium     Last Assessment & Plan:   Formatting of this note might be different from the original.  Patient presents to the clinic today stating that she has been sick since Sunday.  She has had  body aches and chills.  She is somebody that runs every day and has been unable to run for 3 days.  She's had fevers initially they are a bit better now.  At night she wakes up and she soaks her she.  No runny nose or cough the been noted.  She does have a sore throat and body aches.  No vomiting or diarrhea.  No urgency burning or frequency with urination.      Umbilical hernia 05/13/2005     Priority: Medium     Formatting of this note might be different from the original.  LW Onset:  1999  ; Hernia Umbilical      Premenstrual tension syndrome 05/13/2005     Priority: Medium     Formatting of this note might be different from the original.  LW Onset:  2001  ; Premenstrual Syndrome      Diastasis of muscle 05/13/2005     Priority: Medium     Formatting of this note might be different from the original.  LW Onset:  1999  ; Diastasis Recti      Deficiency anemia 05/13/2005     Priority: Medium     Formatting of this note might be different from the original.  Anemia Chronic        Past Medical History:   Diagnosis Date    No known health problems      Past Surgical History:   Procedure Laterality Date    APPENDECTOMY      breast impant  2001    COLONOSCOPY N/A 10/25/2022    Procedure: COLONOSCOPY;  Surgeon: Yusuf Martinez MD;  Location: [facility] REMOVAL OF BREAST IMPLANT  2019     Current Outpatient Medications   Medication Sig Dispense Refill    amoxicillin-clavulanate (AUGMENTIN) 875-125 MG tablet Take 1 tablet by mouth 2 times daily. 14 tablet 3    fish oil-omega-3 fatty acids 500 MG capsule       lactobacillus rhamnosus, GG, (CULTURELL) capsule Take 1 capsule by mouth 2 times daily Called Seed, pre probiotic      lifitegrast (XIIDRA) 5 % opthalmic solution Place 1 drop into both eyes 2 times daily Instill 1 drop in both eyes twice daily  for 90 days.      Multiple Vitamins-Minerals (MULTIVITAMIN WOMEN 50+ PO)       polyethylene glycol (MIRALAX) 17 GM/Dose powder 17 g p.o. 3 times daily until stool loose  "then 17 g p.o. daily.  Constipation 527 g 0    sertraline (ZOLOFT) 25 MG tablet Take 25 mg by mouth      UNABLE TO FIND Curcumin (Patient not taking: Reported on 2024)         No Known Allergies     Social History     Tobacco Use    Smoking status: Never    Smokeless tobacco: Never   Substance Use Topics    Alcohol use: Yes     Comment: Social     {FAMILY HISTORY (Optional):512413692}  History   Drug Use No           {ROS Picklists (Optional):401585}    Objective    There were no vitals taken for this visit.   Estimated body mass index is 19.49 kg/m  as calculated from the following:    Height as of 24: 1.6 m (5' 3\").    Weight as of 24: 49.9 kg (110 lb).  Physical Exam  {Exam List :998417}    Recent Labs   Lab Test 24  2123   HGB 11.6*         POTASSIUM 4.0   CR 0.65        Diagnostics  {LABS:953092}   {EK}    Revised Cardiac Risk Index (RCRI)  The patient has the following serious cardiovascular risks for perioperative complications:  {PREOP REVISED CARDIAC RISK INDEX (RCRI) :672043}     RCRI Interpretation: {REVISED CARDIAC RISK INTERPRETATION :781009}         Signed Electronically by: Bharat Borja MD  A copy of this evaluation report is provided to the requesting physician.    {Provider Resources  Preop [facility] Preop Guidelines  Revised Cardiac Risk Index :775973}   {Email feedback regarding this note to [email or web address]   :009501}  "

## 2025-01-16 NOTE — PROGRESS NOTES
Preventive Care Visit  [facility]  Bharat Borja MD, Internal Medicine  Jan 16, 2025  {Provider  Link to [facility] SmartSet :609420}  Assessment & Plan   59 year old, here for preventive care.    {Diag Picklist:343665}  {Patient advised of split billing (Optional):112275}  Growth      {GROWTH:218155}    Immunizations   {Vaccine counseling is expected when vaccines are given for the first time.   Vaccine counseling would not be expected for subsequent vaccines (after the first of the series) unless there is significant additional documentation:804177}    Anticipatory Guidance    Reviewed age appropriate anticipatory guidance.       Referrals/Ongoing Specialty Care  {Referrals/Ongoing Specialty Care:628101}      Subjective   Bhakti is presenting for the following:  Pre-Op Exam      ***      1/16/2025     1:51 PM   Additional Questions   Accompanied by self            No data to display                   No data to display                      No data to display                    No data to display                   No data to display                   No data to display                  Teen Screen  {Provider  Link to Confidential Note :355535}  {Results  (18-20 YRS):804887}         No data to display                   Objective     Exam      Physical Exam      {Immunization Screening- Place Screening for Ped Immunizations order or choose appropriate list to document responses in note (Optional):660006}  Signed Electronically by: Bharat Borja MD  {Email feedback regarding this note to [email or web address]   :597594}

## 2025-01-16 NOTE — PROGRESS NOTES
Preoperative Evaluation  [facility]  [address]  [address]  [facility] [address]  Phone: [phone]  Primary Provider: Bharat Borja MD  Pre-op Performing Provider: Bharat Borja MD  Jan 16, 2025 1/16/2025   Surgical Information   What procedure is being done? pre op   Facility or Hospital where procedure/surgery will be performed: [facility]   Who is doing the procedure / surgery? Dr milian   Date of surgery / procedure: january 24 2024   Time of surgery / procedure: not sure yet   Where do you plan to recover after surgery? at home with family     Fax number for surgical facility: [phone]    Assessment & Plan     The proposed surgical procedure is considered INTERMEDIATE risk.    Preoperative examination and right bunion  At this time, patient does have a relatively unremarkable physical examination.  Her blood pressure is acceptable level.  She has had some issues with emesis following anesthesia administration, but she has tolerated anesthesia otherwise.  Patient is able to tolerate greater than 4 METS of physical activity.  She does have a CBC and BMP that are pending.  Assuming no unexpected abnormalities on her outstanding labs, patient should be able to proceed with her surgery as scheduled.  She should follow any additional instructions as provided to her by her performing surgeon.  - CBC with platelets and differential; Future  - Basic metabolic panel  (Ca, Cl, CO2, Creat, Gluc, K, Na, BUN); Future            - No identified additional risk factors other than previously addressed    Antiplatelet or Anticoagulation Medication Instructions   - Patient is on no antiplatelet or anticoagulation medications.    Additional Medication Instructions  Patient is on no additional chronic medications    Recommendation  Approval given to proceed with proposed procedure pending review of diagnostic evaluation.    Trisha christensen  59 year old, presenting for the following:  Pre-Op Exam          1/16/2025     1:51 PM   Additional Questions   Roomed by azamit   Accompanied by self         1/16/2025     1:51 PM   Patient Reported Additional Medications   Patient reports taking the following new medications none     HPI related to upcoming procedure: Patient is a 59-year-old  female who presents to the clinic for preoperative examination.  She is currently scheduled undergo a bunionectomy on her right foot on January 24, 2025.  Patient has previously received anesthesia for when she underwent implant procedure, appendectomy, and hernia repairs.  Patient has had some issues with emesis from coming out of sedation, but otherwise she has tolerated anesthesia.  Patient is not aware of any family history of anesthesia intolerance or bleeding disorders.  She does not take any blood thinners.  Patient does run approximately 40 miles per week.        1/16/2025   Pre-Op Questionnaire   Have you ever had a heart attack or stroke? No   Have you ever had surgery on your heart or blood vessels, such as a stent placement, a coronary artery bypass, or surgery on an artery in your head, neck, heart, or legs? No   Do you have chest pain with activity? No   Do you have a history of heart failure? No   Do you currently have a cold, bronchitis or symptoms of other infection? No   Do you have a cough, shortness of breath, or wheezing? No   Do you or anyone in your family have previous history of blood clots? No   Do you or does anyone in your family have a serious bleeding problem such as prolonged bleeding following surgeries or cuts? No   Have you ever had problems with anemia or been told to take iron pills? (!) YES    Have you had any abnormal blood loss such as black, tarry or bloody stools, or abnormal vaginal bleeding? No   Have you ever had a blood transfusion? No   Are you willing to have a blood transfusion if it is medically needed before, during,  or after your surgery? (!) NO    Have you or any of your relatives ever had problems with anesthesia? No   Do you have sleep apnea, excessive snoring or daytime drowsiness? No   Do you have any artifical heart valves or other implanted medical devices like a pacemaker, defibrillator, or continuous glucose monitor? No   Do you have artificial joints? No   Are you allergic to latex? No     Health Care Directive  Patient does not have a Health Care Directive: Discussed advance care planning with patient; however, patient declined at this time.    Preoperative Review of    reviewed - no record of controlled substances prescribed.      Patient Active Problem List    Diagnosis Date Noted    Dry eyes 01/05/2021     Priority: Medium    Capsular contracture of breast implant 05/23/2018     Priority: Medium     Formatting of this note might be different from the original.  Chronic Obstructive Pulmonary Disease; Chronic airway obstruction, not elsewhere classified      Myalgia 04/20/2012     Priority: Medium     Last Assessment & Plan:   Formatting of this note might be different from the original.  Patient presents to the clinic today stating that she has been sick since Sunday.  She has had body aches and chills.  She is somebody that runs every day and has been unable to run for 3 days.  She's had fevers initially they are a bit better now.  At night she wakes up and she soaks her she.  No runny nose or cough the been noted.  She does have a sore throat and body aches.  No vomiting or diarrhea.  No urgency burning or frequency with urination.      Umbilical hernia 05/13/2005     Priority: Medium     Formatting of this note might be different from the original.  LW Onset:  1999  ; Hernia Umbilical      Premenstrual tension syndrome 05/13/2005     Priority: Medium     Formatting of this note might be different from the original.  LW Onset:  2001  ; Premenstrual Syndrome      Diastasis of muscle 05/13/2005     Priority:  Medium     Formatting of this note might be different from the original.  LW Onset:  1999  ; Diastasis Recti      Deficiency anemia 05/13/2005     Priority: Medium     Formatting of this note might be different from the original.  Anemia Chronic        Past Medical History:   Diagnosis Date    No known health problems      Past Surgical History:   Procedure Laterality Date    APPENDECTOMY      breast impant  2001    COLONOSCOPY N/A 10/25/2022    Procedure: COLONOSCOPY;  Surgeon: Yusuf Martinez MD;  Location:  GI    HC REMOVAL OF BREAST IMPLANT  2019     Current Outpatient Medications   Medication Sig Dispense Refill    amoxicillin-clavulanate (AUGMENTIN) 875-125 MG tablet Take 1 tablet by mouth 2 times daily. 14 tablet 3    fish oil-omega-3 fatty acids 500 MG capsule       lactobacillus rhamnosus, GG, (CULTURELL) capsule Take 1 capsule by mouth 2 times daily Called Seed, pre probiotic      lifitegrast (XIIDRA) 5 % opthalmic solution Place 1 drop into both eyes 2 times daily Instill 1 drop in both eyes twice daily  for 90 days.      Multiple Vitamins-Minerals (MULTIVITAMIN WOMEN 50+ PO)       polyethylene glycol (MIRALAX) 17 GM/Dose powder 17 g p.o. 3 times daily until stool loose then 17 g p.o. daily.  Constipation 527 g 0    sertraline (ZOLOFT) 25 MG tablet Take 25 mg by mouth      UNABLE TO FIND Curcumin (Patient not taking: Reported on 1/16/2025)         No Known Allergies     Social History     Tobacco Use    Smoking status: Never    Smokeless tobacco: Never   Substance Use Topics    Alcohol use: Yes     Comment: Social       History   Drug Use No             Review of Systems  CONSTITUTIONAL: NEGATIVE for fever, chills, change in weight  INTEGUMENTARY/SKIN: NEGATIVE for worrisome rashes, moles or lesions  ENT/MOUTH: NEGATIVE for ear, mouth and throat problems  RESP: NEGATIVE for significant cough or SOB  CV: NEGATIVE for chest pain, palpitations or peripheral edema  GI: NEGATIVE for nausea, abdominal  "pain, heartburn, or change in bowel habits  : NEGATIVE for frequency, dysuria, or hematuria  MUSCULOSKELETAL: NEGATIVE for significant arthralgias or myalgia  NEURO: NEGATIVE for weakness, dizziness or paresthesias    Objective    BP 96/63 (BP Location: Right arm, Patient Position: Sitting, Cuff Size: Adult Regular)   Pulse 76   Temp 97.8  F (36.6  C) (Oral)   Resp 16   Ht 1.6 m (5' 3\")   Wt 48.9 kg (107 lb 12.8 oz)   SpO2 98%   BMI 19.10 kg/m     Estimated body mass index is 19.1 kg/m  as calculated from the following:    Height as of this encounter: 1.6 m (5' 3\").    Weight as of this encounter: 48.9 kg (107 lb 12.8 oz).  Physical Exam  Vitals reviewed.   HENT:      Head: Normocephalic and atraumatic.      Right Ear: Tympanic membrane, ear canal and external ear normal.      Left Ear: Tympanic membrane, ear canal and external ear normal.      Mouth/Throat:      Mouth: Mucous membranes are moist.      Pharynx: Oropharynx is clear.   Eyes:      Extraocular Movements: Extraocular movements intact.      Conjunctiva/sclera: Conjunctivae normal.      Pupils: Pupils are equal, round, and reactive to light.   Cardiovascular:      Rate and Rhythm: Normal rate and regular rhythm.      Pulses: Normal pulses.      Heart sounds: Normal heart sounds.   Pulmonary:      Effort: Pulmonary effort is normal.      Breath sounds: Normal breath sounds.   Skin:     General: Skin is warm and dry.      Capillary Refill: Capillary refill takes less than 2 seconds.   Neurological:      Mental Status: She is alert.           Recent Labs   Lab Test 04/12/24  2123   HGB 11.6*         POTASSIUM 4.0   CR 0.65        Diagnostics  Labs pending at this time.  Results will be reviewed when available.   No EKG required, no history of coronary heart disease, significant arrhythmia, peripheral arterial disease or other structural heart disease.    Revised Cardiac Risk Index (RCRI)  The patient has the following serious " cardiovascular risks for perioperative complications:   - No serious cardiac risks = 0 points     RCRI Interpretation: 0 points: Class I (very low risk - 0.4% complication rate)         Signed Electronically by: Bharat Borja MD  A copy of this evaluation report is provided to the requesting physician.

## 2025-02-01 ENCOUNTER — TRANSFERRED RECORDS (OUTPATIENT)
Dept: HEALTH INFORMATION MANAGEMENT | Facility: CLINIC | Age: 60
End: 2025-02-01
Payer: COMMERCIAL

## 2025-02-06 ENCOUNTER — TRANSFERRED RECORDS (OUTPATIENT)
Dept: HEALTH INFORMATION MANAGEMENT | Facility: CLINIC | Age: 60
End: 2025-02-06
Payer: COMMERCIAL

## 2025-04-24 ENCOUNTER — TRANSFERRED RECORDS (OUTPATIENT)
Dept: HEALTH INFORMATION MANAGEMENT | Facility: CLINIC | Age: 60
End: 2025-04-24
Payer: COMMERCIAL

## 2025-05-06 ENCOUNTER — TRANSFERRED RECORDS (OUTPATIENT)
Dept: HEALTH INFORMATION MANAGEMENT | Facility: CLINIC | Age: 60
End: 2025-05-06

## 2025-05-06 ENCOUNTER — LAB REQUISITION (OUTPATIENT)
Dept: LAB | Facility: CLINIC | Age: 60
End: 2025-05-06
Payer: COMMERCIAL

## 2025-05-06 DIAGNOSIS — M25.562 PAIN IN LEFT KNEE: ICD-10-CM

## 2025-05-06 LAB
APPEARANCE FLD: CLEAR
BACTERIA SPEC CULT: NORMAL
COLOR FLD: YELLOW
CRYSTALS SNV MICRO: NORMAL
EOSINOPHIL NFR FLD MANUAL: 1 %
GRAM STAIN RESULT: NORMAL
GRAM STAIN RESULT: NORMAL
LYMPHOCYTES NFR FLD MANUAL: 24 %
MONOS+MACROS NFR FLD MANUAL: 45 %
NEUTS BAND NFR FLD MANUAL: 30 %
SPECIMEN SOURCE FLD: NORMAL
WBC # FLD AUTO: 341 /UL

## 2025-05-08 LAB
BACTERIA SNV CULT: NORMAL
BACTERIA SNV CULT: NORMAL

## 2025-05-10 LAB — B BURGDOR DNA SPEC QL NAA+PROBE: NOT DETECTED

## 2025-05-11 LAB — BACTERIA SNV CULT: NO GROWTH

## 2025-05-15 LAB — BACTERIA SNV CULT: NORMAL

## 2025-05-20 LAB — BACTERIA SNV CULT: NORMAL

## 2025-06-03 ENCOUNTER — VIRTUAL VISIT (OUTPATIENT)
Dept: GASTROENTEROLOGY | Facility: CLINIC | Age: 60
End: 2025-06-03
Attending: PHYSICIAN ASSISTANT
Payer: COMMERCIAL

## 2025-06-03 VITALS — HEIGHT: 63 IN | BODY MASS INDEX: 18.78 KG/M2 | WEIGHT: 106 LBS

## 2025-06-03 DIAGNOSIS — K57.32 DIVERTICULITIS OF COLON: Primary | ICD-10-CM

## 2025-06-03 PROCEDURE — 98005 SYNCH AUDIO-VIDEO EST LOW 20: CPT | Performed by: PHYSICIAN ASSISTANT

## 2025-06-03 ASSESSMENT — PAIN SCALES - GENERAL: PAINLEVEL_OUTOF10: NO PAIN (0)

## 2025-06-03 NOTE — PROGRESS NOTES
GASTROENTEROLOGY Follow-up VIDEO VISIT    CC/REFERRING MD:    Bharat Borja    REASON FOR CONSULTATION:   Dom Pruett for   Chief Complaint   Patient presents with    RECHECK     recheck       HISTORY OF PRESENT ILLNESS:    Bhakti Ibarra is a 60 year old female who is being evaluated via a billable video visit for follow-up of recurrent diverticulitis.  Last visit with me was about 6 months ago.  To review, she has had 2 likely episodes of diverticulitis, the first occurring in 2022 which was not confirmed with CT scan.  The more recent episode was in April 2024, seen in the ER and was confirmed to have acute uncomplicated sigmoid diverticulitis.  She improved quickly with Augmentin.  After that she started fiber supplement and was getting better stool output.  We had noted that her colonoscopy from 2022 noted sigmoid diverticulosis but no polyps, so I had felt that she did not need another colonoscopy following the second episode.      We believe she had a third episode of diverticulitis while traveling to [place] in November 2024, developed left lower quadrant abdominal pain and again took the Augmentin and got better within a few days.  At her last visit, she was back to doing well again, and since then, has continued using fiber supplement daily and is getting stool output roughly every other day, formed, easily passed.  She has been careful with her diet, avoiding nuts and seeds pretty consistently, but wondering about adding them in again to see if she might tolerate it better.  Previously this would give her pain.  Otherwise no new updates for her health.    PREVIOUS ENDOSCOPY:  Colonoscopy 1/25/2022  Impression:               - Diverticulosis in the sigmoid colon.                             - The examination was otherwise normal on direct                             and retroflexion views.                             - No specimens collected.   Recommendation:           - Use  sugar-free Metamucil one teaspoon PO BID PRN.                             - Repeat colonoscopy in 10 years for screening                             purposes.          I have reviewed and updated the patient's Past Medical History, Social History, Family History and Medication List.    Exam:    General appearance:  Healthy appearing adult, in no acute distress  Eyes:  Sclera anicteric, Pupils round and reactive to light  Ears, nose, mouth and throat:  No obvious external lesions of ears and nose.  Hearing intact  Neck:  Symmetric, No obvious external lesions  Respiratory:  Normal respiration, no use of accessory muscles   MSK:  No visual upper extremity, neck or facial muscle atrophy  ABD:  No visual abdominal distention, no audible borborygmi  Skin:  No rashes or jaundice   Psychiatric:  Oriented to person, place and time, Appropriate mood and affect.   Neurologic:  Peripheral muscle function and dexterity appear to be intact      PERTINENT STUDIES have been reviewed.    ASSESSMENT/PLAN:    Bhakti Ibarra is a 60 year old female who presents for follow up of recurrent uncomplicated sigmoid diverticulitis, with 3 total suspected episodes in the past 3 years or so.  Reviewed that at this time, it would be reasonable to continue to follow conservative efforts to treat this, including scaling back to clear liquids and pursuing bowel rest with onset of suspected infection and considering course of Augmentin if not improving within a few days.  She will continue with fiber supplementation and maintaining good hydration, dietary pattern, and avoidance of constipation.  At this time, she does not need further follow-up with me and she can continue to have antibiotics on hand as needed.    1. Diverticulitis of colon (Primary)      Video-Visit Details    Video Visit Time: 15 minutes    Type of service:  Video Visit    Originating Location (pt. Location): Home    Distant Location (provider location):  On-site    Platform  used for Video Visit: Rush    A total of 20 minutes was spent with reviewing the chart, discussing with the patient, documentation and coordination of care on 6/3/2025.    Dom Pruett PA-C  Division of Gastroenterology, Hepatology, and Nutrition  [facility]  [phone]    RTC PRN

## 2025-06-03 NOTE — NURSING NOTE
Current patient location: Patient declined to provide     Is the patient currently in the state of [place]? YES    Visit mode: VIDEO    If the visit is dropped, the patient can be reconnected by:VIDEO VISIT: Text to cell phone:   Telephone Information:   Mobile [phone]       Will anyone else be joining the visit? NO  (If patient encounters technical issues they should call [phone] :[phone])    Are changes needed to the allergy or medication list? No    Are refills needed on medications prescribed by this physician? NO    Rooming Documentation:  Questionnaire(s) completed    Reason for visit: RECHECK (recheck)    Angela MORALES

## (undated) DEVICE — KIT ENDO TURNOVER/PROCEDURE W/CLEAN A SCOPE LINERS 103888

## (undated) RX ORDER — FENTANYL CITRATE 0.05 MG/ML
INJECTION, SOLUTION INTRAMUSCULAR; INTRAVENOUS
Status: DISPENSED
Start: 2022-10-25